# Patient Record
Sex: FEMALE | Race: OTHER | HISPANIC OR LATINO
[De-identification: names, ages, dates, MRNs, and addresses within clinical notes are randomized per-mention and may not be internally consistent; named-entity substitution may affect disease eponyms.]

---

## 2017-03-20 ENCOUNTER — APPOINTMENT (OUTPATIENT)
Dept: FAMILY MEDICINE | Facility: HOSPITAL | Age: 47
End: 2017-03-20

## 2017-03-20 ENCOUNTER — OUTPATIENT (OUTPATIENT)
Dept: OUTPATIENT SERVICES | Facility: HOSPITAL | Age: 47
LOS: 1 days | End: 2017-03-20
Payer: SELF-PAY

## 2017-03-20 VITALS
HEART RATE: 95 BPM | BODY MASS INDEX: 26.66 KG/M2 | DIASTOLIC BLOOD PRESSURE: 70 MMHG | SYSTOLIC BLOOD PRESSURE: 128 MMHG | HEIGHT: 59.5 IN | OXYGEN SATURATION: 99 % | TEMPERATURE: 98 F | WEIGHT: 134 LBS | RESPIRATION RATE: 14 BRPM

## 2017-03-20 PROCEDURE — 87070 CULTURE OTHR SPECIMN AEROBIC: CPT

## 2017-03-20 PROCEDURE — G0463: CPT

## 2018-07-10 ENCOUNTER — APPOINTMENT (OUTPATIENT)
Dept: FAMILY MEDICINE | Facility: HOSPITAL | Age: 48
End: 2018-07-10

## 2018-07-10 ENCOUNTER — RESULT CHARGE (OUTPATIENT)
Age: 48
End: 2018-07-10

## 2018-07-10 ENCOUNTER — OUTPATIENT (OUTPATIENT)
Dept: OUTPATIENT SERVICES | Facility: HOSPITAL | Age: 48
LOS: 1 days | End: 2018-07-10
Payer: SELF-PAY

## 2018-07-10 VITALS
TEMPERATURE: 98 F | OXYGEN SATURATION: 100 % | HEART RATE: 76 BPM | RESPIRATION RATE: 14 BRPM | SYSTOLIC BLOOD PRESSURE: 135 MMHG | WEIGHT: 127 LBS | BODY MASS INDEX: 25.22 KG/M2 | DIASTOLIC BLOOD PRESSURE: 86 MMHG

## 2018-07-10 DIAGNOSIS — Z87.09 PERSONAL HISTORY OF OTHER DISEASES OF THE RESPIRATORY SYSTEM: ICD-10-CM

## 2018-07-10 DIAGNOSIS — E66.3 OVERWEIGHT: ICD-10-CM

## 2018-07-10 DIAGNOSIS — B00.9 HERPESVIRAL INFECTION, UNSPECIFIED: ICD-10-CM

## 2018-07-10 DIAGNOSIS — Z87.42 PERSONAL HISTORY OF OTHER DISEASES OF THE FEMALE GENITAL TRACT: ICD-10-CM

## 2018-07-10 DIAGNOSIS — Z87.2 PERSONAL HISTORY OF DISEASES OF THE SKIN AND SUBCUTANEOUS TISSUE: ICD-10-CM

## 2018-07-10 DIAGNOSIS — Z87.898 PERSONAL HISTORY OF OTHER SPECIFIED CONDITIONS: ICD-10-CM

## 2018-07-10 DIAGNOSIS — J06.9 ACUTE UPPER RESPIRATORY INFECTION, UNSPECIFIED: ICD-10-CM

## 2018-07-10 DIAGNOSIS — N94.9 UNSPECIFIED CONDITION ASSOCIATED WITH FEMALE GENITAL ORGANS AND MENSTRUAL CYCLE: ICD-10-CM

## 2018-07-10 DIAGNOSIS — Z86.69 PERSONAL HISTORY OF OTHER DISEASES OF THE NERVOUS SYSTEM AND SENSE ORGANS: ICD-10-CM

## 2018-07-10 DIAGNOSIS — G47.8 OTHER SLEEP DISORDERS: ICD-10-CM

## 2018-07-10 DIAGNOSIS — Z00.00 ENCOUNTER FOR GENERAL ADULT MEDICAL EXAMINATION WITHOUT ABNORMAL FINDINGS: ICD-10-CM

## 2018-07-10 PROCEDURE — 83036 HEMOGLOBIN GLYCOSYLATED A1C: CPT

## 2018-07-10 PROCEDURE — 80061 LIPID PANEL: CPT

## 2018-07-10 PROCEDURE — 80053 COMPREHEN METABOLIC PANEL: CPT

## 2018-07-10 PROCEDURE — G0463: CPT

## 2018-07-10 RX ORDER — PHENOL 1.4 %
1.4 AEROSOL, SPRAY (ML) MUCOUS MEMBRANE
Qty: 1 | Refills: 0 | Status: DISCONTINUED | COMMUNITY
Start: 2017-03-20 | End: 2018-07-10

## 2018-07-10 RX ORDER — OMEPRAZOLE 20 MG/1
20 CAPSULE, DELAYED RELEASE ORAL DAILY
Qty: 30 | Refills: 2 | Status: DISCONTINUED | COMMUNITY
Start: 2017-03-20 | End: 2018-07-10

## 2018-07-10 RX ORDER — CETIRIZINE HYDROCHLORIDE 10 MG/1
10 CAPSULE, LIQUID FILLED ORAL
Qty: 30 | Refills: 0 | Status: DISCONTINUED | COMMUNITY
Start: 2017-03-20 | End: 2018-07-10

## 2018-07-10 NOTE — HEALTH RISK ASSESSMENT
[Excellent] : ~his/her~  mood as  excellent [] : No [No falls in past year] : Patient reported no falls in the past year [0] : 2) Feeling down, depressed, or hopeless: Not at all (0) [[id number]] : 0 [Patient reported PAP Smear was normal] : Patient reported PAP Smear was normal [Change in mental status noted] : No change in mental status noted [Language] : denies difficulty with language [Behavior] : denies difficulty with behavior [Learning/Retaining New Information] : denies difficulty learning/retaining new information [Handling Complex Tasks] : denies difficulty handling complex tasks [Reasoning] : denies difficulty with reasoning [Spatial Ability and Orientation] : denies difficulty with spatial ability and orientation [None] : None [With Significant Other] : lives with significant other [Employed] : employed [Significant Other] : lives with significant other [Sexually Active] : sexually active [High Risk Behavior] : no high risk behavior [Feels Safe at Home] : Feels safe at home [Fully functional (bathing, dressing, toileting, transferring, walking, feeding)] : Fully functional (bathing, dressing, toileting, transferring, walking, feeding) [Fully functional (using the telephone, shopping, preparing meals, housekeeping, doing laundry, using] : Fully functional and needs no help or supervision to perform IADLs (using the telephone, shopping, preparing meals, housekeeping, doing laundry, using transportation, managing medications and managing finances) [Reports changes in hearing] : Reports no changes in hearing [Reports changes in vision] : Reports no changes in vision [Reports changes in dental health] : Reports no changes in dental health [Smoke Detector] : smoke detector [Carbon Monoxide Detector] : carbon monoxide detector [Guns at Home] : no guns at home [Seat Belt] :  uses seat belt [MammogramComments] : Ordered for Patient [PapSmearDate] : 08/14

## 2018-07-10 NOTE — PAST MEDICAL HISTORY
[Postmenopausal] : postmenopausal [Menopause Age____] : age at menopause was [unfilled] [Approximately ___] : the LMP was approximately [unfilled]

## 2018-07-10 NOTE — HISTORY OF PRESENT ILLNESS
[FreeTextEntry1] : CPE [de-identified] : 47 year old female with PMH of GERD and Prediabetes is here for a CPE. Pt sates she feels well. Reports she has not have her period in 3 years. Pt recently became sexually active and wanted to know if she was pregnant. \par Denies any complaints. No Fever, Chills, Nausea, Vomiting, Diarrhea, Headache, Chest Pain, Shortness of breath or Abdominal pain.\par

## 2018-07-10 NOTE — PLAN
[FreeTextEntry1] : # CPE / Prediabetes\par - Exam WNL\par - Routine labs ordered f/u results\par - Counselled pt regarding weight loss, diet and lifestyle modifications\par \par # Menopause\par - Urine pregnancy test NEG\par \par # HM\par - Mammogram ordered\par - PAP + HPV due in 2019\par \par \par D/W Dr. Horan\par \par

## 2018-07-10 NOTE — PHYSICAL EXAM

## 2018-07-11 DIAGNOSIS — Z12.31 ENCOUNTER FOR SCREENING MAMMOGRAM FOR MALIGNANT NEOPLASM OF BREAST: ICD-10-CM

## 2018-07-11 DIAGNOSIS — R73.03 PREDIABETES: ICD-10-CM

## 2018-07-11 DIAGNOSIS — K21.9 GASTRO-ESOPHAGEAL REFLUX DISEASE WITHOUT ESOPHAGITIS: ICD-10-CM

## 2018-07-11 LAB
BASOPHILS # BLD AUTO: 0.03 K/UL
BASOPHILS NFR BLD AUTO: 0.6 %
EOSINOPHIL # BLD AUTO: 0.13 K/UL
EOSINOPHIL NFR BLD AUTO: 2.7 %
HBA1C MFR BLD HPLC: 5.7 %
HCG UR QL: NEGATIVE
HCT VFR BLD CALC: 40.5 %
HGB BLD-MCNC: 13.3 G/DL
IMM GRANULOCYTES NFR BLD AUTO: 0.2 %
LYMPHOCYTES # BLD AUTO: 2.3 K/UL
LYMPHOCYTES NFR BLD AUTO: 47.4 %
MAN DIFF?: NORMAL
MCHC RBC-ENTMCNC: 30.4 PG
MCHC RBC-ENTMCNC: 32.8 GM/DL
MCV RBC AUTO: 92.7 FL
MONOCYTES # BLD AUTO: 0.23 K/UL
MONOCYTES NFR BLD AUTO: 4.7 %
NEUTROPHILS # BLD AUTO: 2.15 K/UL
NEUTROPHILS NFR BLD AUTO: 44.4 %
PLATELET # BLD AUTO: 278 K/UL
QUALITY CONTROL: YES
RBC # BLD: 4.37 M/UL
RBC # FLD: 15.6 %
WBC # FLD AUTO: 4.85 K/UL

## 2018-07-13 LAB
ALBUMIN SERPL ELPH-MCNC: 4.8 G/DL
ALP BLD-CCNC: 106 U/L
ALT SERPL-CCNC: 14 U/L
ANION GAP SERPL CALC-SCNC: 17 MMOL/L
AST SERPL-CCNC: 21 U/L
BILIRUB SERPL-MCNC: 0.2 MG/DL
BUN SERPL-MCNC: 13 MG/DL
CALCIUM SERPL-MCNC: 9.5 MG/DL
CHLORIDE SERPL-SCNC: 98 MMOL/L
CHOLEST SERPL-MCNC: 222 MG/DL
CHOLEST/HDLC SERPL: 2.9 RATIO
CO2 SERPL-SCNC: 26 MMOL/L
CREAT SERPL-MCNC: 0.61 MG/DL
GLUCOSE SERPL-MCNC: 50 MG/DL
HDLC SERPL-MCNC: 76 MG/DL
LDLC SERPL CALC-MCNC: 109 MG/DL
POTASSIUM SERPL-SCNC: 3.6 MMOL/L
PROT SERPL-MCNC: 8.1 G/DL
SODIUM SERPL-SCNC: 141 MMOL/L
TRIGL SERPL-MCNC: 186 MG/DL

## 2018-08-06 ENCOUNTER — RX RENEWAL (OUTPATIENT)
Age: 48
End: 2018-08-06

## 2018-08-08 ENCOUNTER — APPOINTMENT (OUTPATIENT)
Dept: FAMILY MEDICINE | Facility: HOSPITAL | Age: 48
End: 2018-08-08

## 2018-08-08 ENCOUNTER — OUTPATIENT (OUTPATIENT)
Dept: OUTPATIENT SERVICES | Facility: HOSPITAL | Age: 48
LOS: 1 days | End: 2018-08-08
Payer: SELF-PAY

## 2018-08-08 VITALS
HEART RATE: 76 BPM | TEMPERATURE: 98.7 F | DIASTOLIC BLOOD PRESSURE: 80 MMHG | WEIGHT: 131 LBS | RESPIRATION RATE: 14 BRPM | OXYGEN SATURATION: 99 % | SYSTOLIC BLOOD PRESSURE: 122 MMHG | HEIGHT: 59.5 IN | BODY MASS INDEX: 26.06 KG/M2

## 2018-08-08 DIAGNOSIS — Z00.00 ENCOUNTER FOR GENERAL ADULT MEDICAL EXAMINATION WITHOUT ABNORMAL FINDINGS: ICD-10-CM

## 2018-08-08 PROCEDURE — G0463: CPT

## 2018-08-08 NOTE — PHYSICAL EXAM
[No Acute Distress] : no acute distress [Well Nourished] : well nourished [Well Developed] : well developed [Well-Appearing] : well-appearing [Normal Sclera/Conjunctiva] : normal sclera/conjunctiva [EOMI] : extraocular movements intact [No Respiratory Distress] : no respiratory distress  [Clear to Auscultation] : lungs were clear to auscultation bilaterally [No Accessory Muscle Use] : no accessory muscle use [Normal Rate] : normal rate  [Regular Rhythm] : with a regular rhythm [Normal S1, S2] : normal S1 and S2 [No Murmur] : no murmur heard [Pedal Pulses Present] : the pedal pulses are present [No Edema] : there was no peripheral edema [No Extremity Clubbing/Cyanosis] : no extremity clubbing/cyanosis [No Palpable Aorta] : no palpable aorta [Soft] : abdomen soft [Non Tender] : non-tender [Non-distended] : non-distended [Normal Bowel Sounds] : normal bowel sounds [Normal Posterior Cervical Nodes] : no posterior cervical lymphadenopathy [Grossly Normal Strength/Tone] : grossly normal strength/tone [No Rash] : no rash [Normal Gait] : normal gait [Coordination Grossly Intact] : coordination grossly intact [No Focal Deficits] : no focal deficits [Deep Tendon Reflexes (DTR)] : deep tendon reflexes were 2+ and symmetric [Normal Affect] : the affect was normal [Normal Insight/Judgement] : insight and judgment were intact [de-identified] : prominent veins on dorsum of feet bilaterally, prominent vein on posterior lateral aspect of left calf. shaved legs [de-identified] : no skin discoloration on lower extremity

## 2018-08-08 NOTE — HISTORY OF PRESENT ILLNESS
[FreeTextEntry8] : 46yo F with prediabetes presents to clinic seeking referral for treatment of bilateral lower extremity veins. Patient notes that the varicose veins started after her son was born in 1994, and have progressively gotten worse. The veins are most prominent after walking and when it is warm outside. The veins do not cause her any pain. The patient is requesting the referral "because it does not look good" and " I want to wear shorts". \par \par Patient denies any pain with exercise, swelling, skin color changes of the lower extremities.

## 2018-08-08 NOTE — PLAN
[FreeTextEntry1] : 48yo F w/ h/o prediabetes presents to clinic requesting referral for surgical evaluation of varicose veins. \par \par # Asymptomatic Varicose Veins\par - counseled on the risks of treatment (surgical or injections) eg. infection, reaction from anesthetics, reoccurrence of varicose veins etc. \par - compression stocking prescription given\par \par #Prediabetes: \par - last A1C 5.7 (7/2018)\par - counseled importance of healthy diet and exercise.\par \par Pt seen and examined with Dr. Ocampo

## 2018-08-09 DIAGNOSIS — I83.93 ASYMPTOMATIC VARICOSE VEINS OF BILATERAL LOWER EXTREMITIES: ICD-10-CM

## 2019-04-26 ENCOUNTER — APPOINTMENT (OUTPATIENT)
Age: 49
End: 2019-04-26

## 2019-04-26 ENCOUNTER — OUTPATIENT (OUTPATIENT)
Dept: OUTPATIENT SERVICES | Facility: HOSPITAL | Age: 49
LOS: 1 days | End: 2019-04-26
Payer: SELF-PAY

## 2019-04-26 VITALS
BODY MASS INDEX: 25.02 KG/M2 | DIASTOLIC BLOOD PRESSURE: 75 MMHG | TEMPERATURE: 98.1 F | OXYGEN SATURATION: 100 % | HEART RATE: 75 BPM | WEIGHT: 126 LBS | RESPIRATION RATE: 14 BRPM | SYSTOLIC BLOOD PRESSURE: 119 MMHG

## 2019-04-26 DIAGNOSIS — Z00.00 ENCOUNTER FOR GENERAL ADULT MEDICAL EXAMINATION WITHOUT ABNORMAL FINDINGS: ICD-10-CM

## 2019-04-26 PROCEDURE — G0463: CPT

## 2019-04-26 NOTE — REVIEW OF SYSTEMS
[Fever] : no fever [Chills] : no chills [Chest Pain] : no chest pain [Palpitations] : no palpitations [Shortness Of Breath] : no shortness of breath [Wheezing] : no wheezing [Nausea] : no nausea [Abdominal Pain] : no abdominal pain [Constipation] : no constipation [Vomiting] : no vomiting [Diarrhea] : diarrhea

## 2019-04-26 NOTE — PLAN
[FreeTextEntry1] : 48F w/PMH of GERD, prediabetes presents w/ a 7 day hx of hives likely 2/2 new detergent\par \par #Urticaria\par - Start OTC Cetirizine\par - Advised pt to avoid new detergent and new foods\par - RTC in 1 week if not resolved or head to ED if difficulty breathing\par \par Case discussed w/ Dr Shields

## 2019-04-26 NOTE — HISTORY OF PRESENT ILLNESS
[FreeTextEntry8] : 48F w/PMH of GERD, prediabetes presents w/ a 7 day hx of hives. Urticaria present on medial aspect of RUE and B/L medial thighs. Urticaria is pruritic, waxing/waning. Pt has not tried OTC medications. Pt reports she recently changed clothing detergent from ALL to Tide, also reports recently at frozen food which she had not tried before. No SoB, no angioedema. No new medications or new pets.

## 2019-04-26 NOTE — PHYSICAL EXAM
[Well Nourished] : well nourished [Well Developed] : well developed [No Acute Distress] : no acute distress [Well-Appearing] : well-appearing [Normal Sclera/Conjunctiva] : normal sclera/conjunctiva [Normal Rate] : normal rate  [EOMI] : extraocular movements intact [PERRL] : pupils equal round and reactive to light [Normal S1, S2] : normal S1 and S2 [Regular Rhythm] : with a regular rhythm [No Murmur] : no murmur heard [de-identified] : Urticaria of medial aspect of RUE, light abrasions from scratching

## 2019-04-29 DIAGNOSIS — L50.9 URTICARIA, UNSPECIFIED: ICD-10-CM

## 2020-01-08 ENCOUNTER — OUTPATIENT (OUTPATIENT)
Dept: OUTPATIENT SERVICES | Facility: HOSPITAL | Age: 50
LOS: 1 days | End: 2020-01-08
Payer: SELF-PAY

## 2020-01-08 ENCOUNTER — APPOINTMENT (OUTPATIENT)
Dept: FAMILY MEDICINE | Facility: HOSPITAL | Age: 50
End: 2020-01-08

## 2020-01-08 ENCOUNTER — MED ADMIN CHARGE (OUTPATIENT)
Age: 50
End: 2020-01-08

## 2020-01-08 VITALS
SYSTOLIC BLOOD PRESSURE: 129 MMHG | DIASTOLIC BLOOD PRESSURE: 84 MMHG | TEMPERATURE: 98.8 F | OXYGEN SATURATION: 100 % | RESPIRATION RATE: 14 BRPM | WEIGHT: 132 LBS | BODY MASS INDEX: 26.21 KG/M2 | HEART RATE: 82 BPM

## 2020-01-08 DIAGNOSIS — L50.8 OTHER URTICARIA: ICD-10-CM

## 2020-01-08 DIAGNOSIS — Z00.00 ENCOUNTER FOR GENERAL ADULT MEDICAL EXAMINATION WITHOUT ABNORMAL FINDINGS: ICD-10-CM

## 2020-01-08 PROCEDURE — 80048 BASIC METABOLIC PNL TOTAL CA: CPT

## 2020-01-08 PROCEDURE — 83036 HEMOGLOBIN GLYCOSYLATED A1C: CPT

## 2020-01-08 PROCEDURE — 90471 IMMUNIZATION ADMIN: CPT

## 2020-01-08 PROCEDURE — G0008: CPT

## 2020-01-08 PROCEDURE — 80061 LIPID PANEL: CPT

## 2020-01-08 PROCEDURE — G0463: CPT

## 2020-01-08 RX ORDER — RANITIDINE HCL 75 MG
75 TABLET ORAL
Qty: 60 | Refills: 0 | Status: DISCONTINUED | COMMUNITY
Start: 2018-08-06 | End: 2020-01-08

## 2020-01-08 RX ORDER — RANITIDINE 75 MG/1
75 TABLET ORAL
Qty: 60 | Refills: 0 | Status: DISCONTINUED | COMMUNITY
Start: 2018-07-10 | End: 2020-01-08

## 2020-01-08 NOTE — REVIEW OF SYSTEMS
[Earache] : earache [Nasal Discharge] : nasal discharge [Fever] : no fever [Chills] : no chills [Hoarseness] : no hoarseness [Hearing Loss] : no hearing loss [Postnasal Drip] : no postnasal drip [Chest Pain] : no chest pain [Palpitations] : no palpitations [Wheezing] : no wheezing [Shortness Of Breath] : no shortness of breath [Constipation] : no constipation [Nausea] : no nausea [Abdominal Pain] : no abdominal pain [Vomiting] : no vomiting [Diarrhea] : diarrhea

## 2020-01-08 NOTE — HISTORY OF PRESENT ILLNESS
[de-identified] : 48F w/PMH of GERD, prediabetes presents for CPE. Pt has complaint today of 4 day hx of R ear pain. Slight stabbing, pain radiates down R neck. Has not tried any OTC medications. Endorses some nasal congestion. Denies fevers, ear discharge, sore throat, sick contacts. Also endorses some mild fatigue. Pt otherwise has no complaints at this time and denies fever/chills, NVD, weight loss/gain, CP, SoB. Works as a . LMP a few years ago.  [FreeTextEntry1] : CPE

## 2020-01-08 NOTE — PLAN
[FreeTextEntry1] : 48F w/PMH of GERD, prediabetes presents for CPE. Complaining of R ear fullness.\par \par #Ear fullness \par - No sign of otitis externa, no effusion\par - Ibuprofen PRN for pain\par - Patient instructed to return for new, persistent or increasing symptoms and verbalized understanding\par \par #Glaucoma\par - Pt presented slip requesting referral to optho for glaucoma screening\par \par #HM\par - F/U CBC, BMP, lipids\par - Weight: goal BMI <25. \par - Physical Activity: Advised pt 150 min/wk aerobic activity recommended\par - Medical Nutritional Therapy: Mediterranean diet recommended. \par - Smoking: non-smoker \par - RTC in 2wks for pap\par \par #Prediabetes\par - Previous A1c 5.7 on  July 2018\par - F/U A1c \par \par #Need for Tdap\par - Tdap today \par \par #Need for flu vaccine \par - Flu vaccine today \par \par Case discussed w/ Dr Rascon

## 2020-01-08 NOTE — PHYSICAL EXAM
[No Acute Distress] : no acute distress [Well Nourished] : well nourished [Well-Appearing] : well-appearing [Well Developed] : well developed [EOMI] : extraocular movements intact [Normal Sclera/Conjunctiva] : normal sclera/conjunctiva [Normal Outer Ear/Nose] : the outer ears and nose were normal in appearance [No Lymphadenopathy] : no lymphadenopathy [Supple] : supple [Thyroid Normal, No Nodules] : the thyroid was normal and there were no nodules present [No Respiratory Distress] : no respiratory distress  [No Accessory Muscle Use] : no accessory muscle use [Normal Rate] : normal rate  [Regular Rhythm] : with a regular rhythm [Clear to Auscultation] : lungs were clear to auscultation bilaterally [Normal S1, S2] : normal S1 and S2 [No Murmur] : no murmur heard [Pedal Pulses Present] : the pedal pulses are present [No Edema] : there was no peripheral edema [Non Tender] : non-tender [Soft] : abdomen soft [No Extremity Clubbing/Cyanosis] : no extremity clubbing/cyanosis [Non-distended] : non-distended [No Masses] : no abdominal mass palpated [No HSM] : no HSM [Normal Posterior Cervical Nodes] : no posterior cervical lymphadenopathy [Normal Bowel Sounds] : normal bowel sounds [Normal Anterior Cervical Nodes] : no anterior cervical lymphadenopathy [No Joint Swelling] : no joint swelling [No Rash] : no rash [Coordination Grossly Intact] : coordination grossly intact [Grossly Normal Strength/Tone] : grossly normal strength/tone [No Focal Deficits] : no focal deficits [Normal Gait] : normal gait [Normal Insight/Judgement] : insight and judgment were intact [Normal Affect] : the affect was normal [Alert and Oriented x3] : oriented to person, place, and time [de-identified] : No tenderness on manipulation of the R ear. No erythema, tympanic membrane bulging. No postnasal drip. No sinus discharge.

## 2020-01-13 LAB
ANION GAP SERPL CALC-SCNC: 14 MMOL/L
BASOPHILS # BLD AUTO: 0.04 K/UL
BASOPHILS NFR BLD AUTO: 0.9 %
BUN SERPL-MCNC: 10 MG/DL
CALCIUM SERPL-MCNC: 9.5 MG/DL
CHLORIDE SERPL-SCNC: 105 MMOL/L
CHOLEST SERPL-MCNC: 220 MG/DL
CHOLEST/HDLC SERPL: 3.4 RATIO
CO2 SERPL-SCNC: 24 MMOL/L
CREAT SERPL-MCNC: 0.62 MG/DL
EOSINOPHIL # BLD AUTO: 0.16 K/UL
EOSINOPHIL NFR BLD AUTO: 3.4 %
ESTIMATED AVERAGE GLUCOSE: 126 MG/DL
GLUCOSE SERPL-MCNC: 102 MG/DL
HBA1C MFR BLD HPLC: 6 %
HCT VFR BLD CALC: 40.8 %
HDLC SERPL-MCNC: 64 MG/DL
HGB BLD-MCNC: 12.9 G/DL
IMM GRANULOCYTES NFR BLD AUTO: 0.2 %
LDLC SERPL CALC-MCNC: 122 MG/DL
LYMPHOCYTES # BLD AUTO: 1.47 K/UL
LYMPHOCYTES NFR BLD AUTO: 31.3 %
MAN DIFF?: NORMAL
MCHC RBC-ENTMCNC: 29.4 PG
MCHC RBC-ENTMCNC: 31.6 GM/DL
MCV RBC AUTO: 92.9 FL
MONOCYTES # BLD AUTO: 0.36 K/UL
MONOCYTES NFR BLD AUTO: 7.7 %
NEUTROPHILS # BLD AUTO: 2.66 K/UL
NEUTROPHILS NFR BLD AUTO: 56.5 %
PLATELET # BLD AUTO: 268 K/UL
POTASSIUM SERPL-SCNC: 5 MMOL/L
RBC # BLD: 4.39 M/UL
RBC # FLD: 13.6 %
SODIUM SERPL-SCNC: 142 MMOL/L
TRIGL SERPL-MCNC: 172 MG/DL
WBC # FLD AUTO: 4.7 K/UL

## 2020-01-16 DIAGNOSIS — Z23 ENCOUNTER FOR IMMUNIZATION: ICD-10-CM

## 2020-01-16 DIAGNOSIS — R73.03 PREDIABETES: ICD-10-CM

## 2020-01-16 DIAGNOSIS — H93.8X1 OTHER SPECIFIED DISORDERS OF RIGHT EAR: ICD-10-CM

## 2020-01-16 DIAGNOSIS — H40.9 UNSPECIFIED GLAUCOMA: ICD-10-CM

## 2020-01-25 ENCOUNTER — APPOINTMENT (OUTPATIENT)
Dept: FAMILY MEDICINE | Facility: HOSPITAL | Age: 50
End: 2020-01-25

## 2020-09-12 ENCOUNTER — OUTPATIENT (OUTPATIENT)
Dept: OUTPATIENT SERVICES | Facility: HOSPITAL | Age: 50
LOS: 1 days | End: 2020-09-12
Payer: SELF-PAY

## 2020-09-12 ENCOUNTER — RESULT CHARGE (OUTPATIENT)
Age: 50
End: 2020-09-12

## 2020-09-12 ENCOUNTER — APPOINTMENT (OUTPATIENT)
Dept: FAMILY MEDICINE | Facility: HOSPITAL | Age: 50
End: 2020-09-12

## 2020-09-12 VITALS
BODY MASS INDEX: 25.62 KG/M2 | SYSTOLIC BLOOD PRESSURE: 145 MMHG | RESPIRATION RATE: 14 BRPM | TEMPERATURE: 98 F | OXYGEN SATURATION: 98 % | WEIGHT: 129 LBS | DIASTOLIC BLOOD PRESSURE: 90 MMHG | HEART RATE: 71 BPM

## 2020-09-12 DIAGNOSIS — M54.9 DORSALGIA, UNSPECIFIED: ICD-10-CM

## 2020-09-12 DIAGNOSIS — Z00.00 ENCOUNTER FOR GENERAL ADULT MEDICAL EXAMINATION WITHOUT ABNORMAL FINDINGS: ICD-10-CM

## 2020-09-12 PROCEDURE — G0463: CPT

## 2020-09-12 NOTE — REVIEW OF SYSTEMS
[Dryness] : dryness  [Vision Problems] : vision problems [Muscle Pain] : muscle pain [Back Pain] : back pain [Fever] : no fever [Chills] : no chills [Fatigue] : no fatigue [Hot Flashes] : no hot flashes [Night Sweats] : no night sweats [Recent Change In Weight] : ~T no recent weight change [Discharge] : no discharge [Pain] : no pain [Redness] : no redness [Itching] : no itching [Chest Pain] : no chest pain [Palpitations] : no palpitations [Shortness Of Breath] : no shortness of breath [Wheezing] : no wheezing [Dysuria] : no dysuria [Incontinence] : no incontinence [Nocturia] : no nocturia [Hematuria] : no hematuria [Frequency] : no frequency [Vaginal Discharge] : no vaginal discharge [Dysmenorrhea] : no dysmenorrhea [Joint Pain] : no joint pain [Joint Stiffness] : no joint stiffness [Joint Swelling] : no joint swelling [Muscle Weakness] : no muscle weakness

## 2020-09-12 NOTE — ASSESSMENT
[FreeTextEntry1] : \par #Left back pain secondary to back strain\par -Encouraged tylenol and Naproxen 250 mg 2 pills BID PRN with food and take an extra pill if it bothers her more\par -Patient given back exercises to do at home. \par \par #Eye dryness\par -Allergic conjuntivitis vs Glaucoma(low suspicion) \par -Opthalmology referral given\par \par #Prediabetes\par -A1c 6. Wieght stable\par -Encouraged wieght loss\par - Discussed the benefits of weight loss with pt, and risks associated with obesity. Pt is receptive to lifestyle modification techniques to lose weight - at least 30mins-1hr aerobic exercises/day x5 days per week advised\par - Decreased food portion sizes, increase in whole grains, assorted vegetables and fruits and decreased sugar beverages discussed and encouraged\par - Weight loss goal of 4-5lbs within the next 4-5 weeks encouraged. Pt is receptive to this goal.\par \par #Health\par -Flu vaccine given today\par -Patient deferred pap/HPV for next visit\par \par RTC in one week for PAP/HPV\par

## 2020-09-12 NOTE — PHYSICAL EXAM
[No Acute Distress] : no acute distress [Well Nourished] : well nourished [Well Developed] : well developed [PERRL] : pupils equal round and reactive to light [Normal Sclera/Conjunctiva] : normal sclera/conjunctiva [EOMI] : extraocular movements intact [Regular Rhythm] : with a regular rhythm [No Respiratory Distress] : no respiratory distress  [Normal Rate] : normal rate  [de-identified] : Back:\par Inspection: No Erythema present, Iliac crest height equal,\par Palpation: NTTP along Spinous processes L1-L5 , Paraspinal muscles, iliac crests non tender, SIJ non tender, gluteal region non tender\par ROM: Forward flexion 80 degrees with pain in left back, Extension 30 degrees without pain, Adduction 30 degrees with pain in left lower back                                  Neuurological: DTR's - Patellar 2+/4, Achilles 2+/4 and equal bilateral \par Sensation- intact to light touch L2-L5, S1\par Strength testing: Knee extension: 5/5, dorsiflexion 5/5, plantarflexion 5/5, great toe extension: 5/5.\par \par Special tests:\par \par Straight leg test negative\par

## 2020-09-12 NOTE — HISTORY OF PRESENT ILLNESS
[FreeTextEntry8] : Patient is a 49 year old F with a significant history of prediabetes a1c 6, allergic conjuntivitis who presents with  subacute left back pain. Patient reports the pain started 2 months ago insidiously without inciting trauma and has been intermittent. Patient states the pain is located along left lower back/flank, nonradiating,5/10, dull pain that is triggered by positional movement. Patient states she hasn't iced, heated, taken medications for the pain. Pain and lasts for a couple seconds.  Patient denies urinary/rectal incontinence, fevers/chills, motor or sensory changes, bowel/bladder changes, or any prior trauma/surgical history.  \par \par Of note, Patient reports she has chronic dry eyes that bother her daily. Patient reports using eye drops BID with relief. Patient has not seen ophthalmologist in 6 years. Patient reports that the pain is sometimes associated with blurry vision and pain bilaterally.  \par \par \par

## 2020-09-15 DIAGNOSIS — S39.012A STRAIN OF MUSCLE, FASCIA AND TENDON OF LOWER BACK, INITIAL ENCOUNTER: ICD-10-CM

## 2020-09-19 ENCOUNTER — APPOINTMENT (OUTPATIENT)
Dept: FAMILY MEDICINE | Facility: HOSPITAL | Age: 50
End: 2020-09-19

## 2020-10-01 LAB
BILIRUB UR QL STRIP: NORMAL
GLUCOSE UR-MCNC: NORMAL
HCG UR QL: 0.2 EU/DL
HGB UR QL STRIP.AUTO: NORMAL
KETONES UR-MCNC: NORMAL
LEUKOCYTE ESTERASE UR QL STRIP: NORMAL
NITRITE UR QL STRIP: NORMAL
PH UR STRIP: 7
PROT UR STRIP-MCNC: NORMAL
SP GR UR STRIP: 1025

## 2020-12-15 ENCOUNTER — APPOINTMENT (OUTPATIENT)
Dept: OPHTHALMOLOGY | Facility: CLINIC | Age: 50
End: 2020-12-15

## 2021-01-09 ENCOUNTER — OUTPATIENT (OUTPATIENT)
Dept: OUTPATIENT SERVICES | Facility: HOSPITAL | Age: 51
LOS: 1 days | End: 2021-01-09
Payer: SELF-PAY

## 2021-01-09 ENCOUNTER — APPOINTMENT (OUTPATIENT)
Dept: FAMILY MEDICINE | Facility: HOSPITAL | Age: 51
End: 2021-01-09

## 2021-01-09 VITALS
WEIGHT: 128 LBS | BODY MASS INDEX: 25.8 KG/M2 | HEART RATE: 73 BPM | OXYGEN SATURATION: 99 % | TEMPERATURE: 97.6 F | DIASTOLIC BLOOD PRESSURE: 83 MMHG | HEIGHT: 59 IN | SYSTOLIC BLOOD PRESSURE: 120 MMHG | RESPIRATION RATE: 16 BRPM

## 2021-01-09 DIAGNOSIS — K21.9 GASTRO-ESOPHAGEAL REFLUX DISEASE W/OUT ESOPHAGITIS: ICD-10-CM

## 2021-01-09 DIAGNOSIS — H93.8X1 OTHER SPECIFIED DISORDERS OF RIGHT EAR: ICD-10-CM

## 2021-01-09 DIAGNOSIS — I83.93 ASYMPTOMATIC VARICOSE VEINS OF BILATERAL LOWER EXTREMITIES: ICD-10-CM

## 2021-01-09 DIAGNOSIS — Z00.00 ENCOUNTER FOR GENERAL ADULT MEDICAL EXAMINATION W/OUT ABNORMAL FINDINGS: ICD-10-CM

## 2021-01-09 DIAGNOSIS — Z23 ENCOUNTER FOR IMMUNIZATION: ICD-10-CM

## 2021-01-09 DIAGNOSIS — H40.9 UNSPECIFIED GLAUCOMA: ICD-10-CM

## 2021-01-09 DIAGNOSIS — Z92.29 PERSONAL HISTORY OF OTHER DRUG THERAPY: ICD-10-CM

## 2021-01-09 DIAGNOSIS — S39.012A STRAIN OF MUSCLE, FASCIA AND TENDON OF LOWER BACK, INITIAL ENCOUNTER: ICD-10-CM

## 2021-01-09 DIAGNOSIS — Z00.00 ENCOUNTER FOR GENERAL ADULT MEDICAL EXAMINATION WITHOUT ABNORMAL FINDINGS: ICD-10-CM

## 2021-01-09 PROCEDURE — G0463: CPT

## 2021-01-09 PROCEDURE — 82274 ASSAY TEST FOR BLOOD FECAL: CPT

## 2021-01-09 RX ORDER — NAPROXEN 250 MG/1
250 TABLET ORAL TWICE DAILY
Qty: 10 | Refills: 0 | Status: DISCONTINUED | COMMUNITY
Start: 2020-09-12 | End: 2021-01-09

## 2021-01-09 RX ORDER — CYCLOBENZAPRINE HYDROCHLORIDE 10 MG/1
10 TABLET, FILM COATED ORAL
Qty: 10 | Refills: 0 | Status: DISCONTINUED | COMMUNITY
Start: 2020-09-12 | End: 2021-01-09

## 2021-01-09 NOTE — HEALTH RISK ASSESSMENT
[Good] : ~his/her~ current health as good [Poor] : ~his/her~ mood as  poor [] : No [No] : In the past 12 months have you used drugs other than those required for medical reasons? No [2] : 2) Feeling down, depressed, or hopeless for more than half of the days (2) [[id number]] : 4 [[id number]] : 22 [Fully functional (bathing, dressing, toileting, transferring, walking, feeding)] : Fully functional (bathing, dressing, toileting, transferring, walking, feeding) [Fully functional (using the telephone, shopping, preparing meals, housekeeping, doing laundry, using] : Fully functional and needs no help or supervision to perform IADLs (using the telephone, shopping, preparing meals, housekeeping, doing laundry, using transportation, managing medications and managing finances) [With Patient/Caregiver] : With Patient/Caregiver [AdvancecareDate] : 01/20

## 2021-01-09 NOTE — PHYSICAL EXAM
[No Acute Distress] : no acute distress [Well Nourished] : well nourished [Well Developed] : well developed [Well-Appearing] : well-appearing [Normal Sclera/Conjunctiva] : normal sclera/conjunctiva [PERRL] : pupils equal round and reactive to light [EOMI] : extraocular movements intact [Normal Outer Ear/Nose] : the outer ears and nose were normal in appearance [Normal Oropharynx] : the oropharynx was normal [No JVD] : no jugular venous distention [No Lymphadenopathy] : no lymphadenopathy [Supple] : supple [Thyroid Normal, No Nodules] : the thyroid was normal and there were no nodules present [No Respiratory Distress] : no respiratory distress  [No Accessory Muscle Use] : no accessory muscle use [Clear to Auscultation] : lungs were clear to auscultation bilaterally [Normal Rate] : normal rate  [Regular Rhythm] : with a regular rhythm [Normal S1, S2] : normal S1 and S2 [No Murmur] : no murmur heard [No Carotid Bruits] : no carotid bruits [No Abdominal Bruit] : a ~M bruit was not heard ~T in the abdomen [No Varicosities] : no varicosities [Pedal Pulses Present] : the pedal pulses are present [No Edema] : there was no peripheral edema [No Palpable Aorta] : no palpable aorta [No Extremity Clubbing/Cyanosis] : no extremity clubbing/cyanosis [Soft] : abdomen soft [Non Tender] : non-tender [Non-distended] : non-distended [No Masses] : no abdominal mass palpated [No HSM] : no HSM [Normal Bowel Sounds] : normal bowel sounds [Normal Posterior Cervical Nodes] : no posterior cervical lymphadenopathy [Normal Anterior Cervical Nodes] : no anterior cervical lymphadenopathy [No CVA Tenderness] : no CVA  tenderness [No Spinal Tenderness] : no spinal tenderness [No Joint Swelling] : no joint swelling [Grossly Normal Strength/Tone] : grossly normal strength/tone [No Rash] : no rash [Coordination Grossly Intact] : coordination grossly intact [No Focal Deficits] : no focal deficits [Normal Gait] : normal gait [Speech Grossly Normal] : speech grossly normal [Normal Affect] : the affect was normal [Alert and Oriented x3] : oriented to person, place, and time [Normal Insight/Judgement] : insight and judgment were intact [de-identified] : tearful [de-identified] : FROM in all extremities and hip with the exception of mild pain with left sided rotation of the hip. no tenderness to palpation.

## 2021-01-09 NOTE — HISTORY OF PRESENT ILLNESS
[FreeTextEntry1] : CPE [de-identified] : 50F with PMH of preDM, GERD, Glaucoma presents for CPE. Reports left sided pain since August. Pt was seen for this pain in September and was given Flexiril which did not help. Describes pain as constant 7-10/10 worse with rest and resolved with work. Works as a house keeper. Denies any other associated symptoms. \par Pt also reports she has been depressed for months now. Reports suicidal ideation sometimes but has never thought about plan. she just 'feels she will be better not being alive'. \par PHq9; 22. no suicidal/homicidal ideation today. \par states she has family support at home.

## 2021-01-11 ENCOUNTER — NON-APPOINTMENT (OUTPATIENT)
Age: 51
End: 2021-01-11

## 2021-01-12 ENCOUNTER — NON-APPOINTMENT (OUTPATIENT)
Age: 51
End: 2021-01-12

## 2021-01-12 DIAGNOSIS — Z12.11 ENCOUNTER FOR SCREENING FOR MALIGNANT NEOPLASM OF COLON: ICD-10-CM

## 2021-01-12 DIAGNOSIS — Z12.31 ENCOUNTER FOR SCREENING MAMMOGRAM FOR MALIGNANT NEOPLASM OF BREAST: ICD-10-CM

## 2021-01-12 DIAGNOSIS — R10.9 UNSPECIFIED ABDOMINAL PAIN: ICD-10-CM

## 2021-01-12 DIAGNOSIS — F32.9 MAJOR DEPRESSIVE DISORDER, SINGLE EPISODE, UNSPECIFIED: ICD-10-CM

## 2021-01-12 DIAGNOSIS — R73.03 PREDIABETES: ICD-10-CM

## 2021-01-16 LAB — HEMOCCULT STL QL IA: NEGATIVE

## 2021-01-23 ENCOUNTER — APPOINTMENT (OUTPATIENT)
Dept: FAMILY MEDICINE | Facility: HOSPITAL | Age: 51
End: 2021-01-23

## 2021-04-13 ENCOUNTER — APPOINTMENT (OUTPATIENT)
Dept: OPHTHALMOLOGY | Facility: CLINIC | Age: 51
End: 2021-04-13

## 2021-09-27 ENCOUNTER — APPOINTMENT (OUTPATIENT)
Dept: FAMILY MEDICINE | Facility: HOSPITAL | Age: 51
End: 2021-09-27

## 2021-09-27 ENCOUNTER — OUTPATIENT (OUTPATIENT)
Dept: OUTPATIENT SERVICES | Facility: HOSPITAL | Age: 51
LOS: 1 days | End: 2021-09-27
Payer: SELF-PAY

## 2021-09-27 DIAGNOSIS — Z00.00 ENCOUNTER FOR GENERAL ADULT MEDICAL EXAMINATION WITHOUT ABNORMAL FINDINGS: ICD-10-CM

## 2021-09-27 PROCEDURE — U0003: CPT

## 2021-09-27 PROCEDURE — G0463: CPT

## 2021-09-27 PROCEDURE — U0005: CPT

## 2021-09-27 NOTE — HISTORY OF PRESENT ILLNESS
[Other Location: e.g. School (Enter Location, City,State)___] : at [unfilled], at the time of the visit. [Medical Office: ([facility])___] : at the medical office located in  [Verbal consent obtained from patient] : the patient, [unfilled] [FreeTextEntry1] : 50 y/o F presents to clinic for COVID-19 testing. Patient reports of non productive cough, congestion and subjective fever for few days. Patient denies headache, dizziness, change in vision, blurry vision, anosmia, ageusia, chest pain, palpitations, shortness of breath, abdominal pain, nausea, vomiting, diarrhea, constipation, hematochezia, melena, change in bowel movement, weight loss, dysuria, urinary frequency, urgency, hematuria, numbness, weakness or tingling. No recent travel. No known sick contacts.

## 2021-09-28 DIAGNOSIS — Z20.822 CONTACT WITH AND (SUSPECTED) EXPOSURE TO COVID-19: ICD-10-CM

## 2021-09-28 LAB — SARS-COV-2 N GENE NPH QL NAA+PROBE: NOT DETECTED

## 2021-10-04 ENCOUNTER — OUTPATIENT (OUTPATIENT)
Dept: OUTPATIENT SERVICES | Facility: HOSPITAL | Age: 51
LOS: 1 days | End: 2021-10-04
Payer: SELF-PAY

## 2021-10-04 ENCOUNTER — MED ADMIN CHARGE (OUTPATIENT)
Age: 51
End: 2021-10-04

## 2021-10-04 ENCOUNTER — APPOINTMENT (OUTPATIENT)
Dept: FAMILY MEDICINE | Facility: HOSPITAL | Age: 51
End: 2021-10-04

## 2021-10-04 VITALS
OXYGEN SATURATION: 100 % | BODY MASS INDEX: 25.45 KG/M2 | RESPIRATION RATE: 16 BRPM | TEMPERATURE: 97.4 F | DIASTOLIC BLOOD PRESSURE: 87 MMHG | WEIGHT: 126 LBS | SYSTOLIC BLOOD PRESSURE: 129 MMHG | HEART RATE: 84 BPM

## 2021-10-04 DIAGNOSIS — Z00.00 ENCOUNTER FOR GENERAL ADULT MEDICAL EXAMINATION WITHOUT ABNORMAL FINDINGS: ICD-10-CM

## 2021-10-04 DIAGNOSIS — Z20.822 CONTACT WITH AND (SUSPECTED) EXPOSURE TO COVID-19: ICD-10-CM

## 2021-10-04 DIAGNOSIS — Z12.11 ENCOUNTER FOR SCREENING FOR MALIGNANT NEOPLASM OF COLON: ICD-10-CM

## 2021-10-04 DIAGNOSIS — Z87.898 PERSONAL HISTORY OF OTHER SPECIFIED CONDITIONS: ICD-10-CM

## 2021-10-04 DIAGNOSIS — J30.9 ALLERGIC RHINITIS, UNSPECIFIED: ICD-10-CM

## 2021-10-04 DIAGNOSIS — R05 COUGH: ICD-10-CM

## 2021-10-04 DIAGNOSIS — Z92.89 PERSONAL HISTORY OF OTHER MEDICAL TREATMENT: ICD-10-CM

## 2021-10-04 PROCEDURE — G0463: CPT

## 2021-10-06 DIAGNOSIS — R05.9 COUGH, UNSPECIFIED: ICD-10-CM

## 2021-10-06 DIAGNOSIS — J30.9 ALLERGIC RHINITIS, UNSPECIFIED: ICD-10-CM

## 2021-10-06 DIAGNOSIS — Z12.39 ENCOUNTER FOR OTHER SCREENING FOR MALIGNANT NEOPLASM OF BREAST: ICD-10-CM

## 2021-10-07 NOTE — HISTORY OF PRESENT ILLNESS
[FreeTextEntry8] : 51F with PMH of preDM presents acutely for cough. Pt was tested last week for COVID, resulted negative. Pt endorses HA located to the sinuses, rhinorrhea, cough, occasional sore throat and pressure behind eyes. Pt endorses sx onset since receiving COVID vaccine in August, these sx have persisted. To note, pt has not received 2nd dose of COVID vaccine yet. Denies fevers, chills, sick contacts or recent travel.

## 2021-10-07 NOTE — REVIEW OF SYSTEMS
[Nasal Discharge] : nasal discharge [Sore Throat] : sore throat [Postnasal Drip] : postnasal drip [Cough] : cough [Negative] : Musculoskeletal [Earache] : no earache [Hearing Loss] : no hearing loss [Nosebleed] : no nosebleeds [Hoarseness] : no hoarseness [Shortness Of Breath] : no shortness of breath [Wheezing] : no wheezing

## 2021-10-20 ENCOUNTER — RX RENEWAL (OUTPATIENT)
Age: 51
End: 2021-10-20

## 2021-11-08 ENCOUNTER — APPOINTMENT (OUTPATIENT)
Dept: FAMILY MEDICINE | Facility: HOSPITAL | Age: 51
End: 2021-11-08

## 2022-04-02 ENCOUNTER — OUTPATIENT (OUTPATIENT)
Dept: OUTPATIENT SERVICES | Facility: HOSPITAL | Age: 52
LOS: 1 days | End: 2022-04-02
Payer: SELF-PAY

## 2022-04-02 ENCOUNTER — APPOINTMENT (OUTPATIENT)
Dept: FAMILY MEDICINE | Facility: HOSPITAL | Age: 52
End: 2022-04-02

## 2022-04-02 ENCOUNTER — OUTPATIENT (OUTPATIENT)
Dept: OUTPATIENT SERVICES | Facility: HOSPITAL | Age: 52
LOS: 1 days | End: 2022-04-02
Payer: COMMERCIAL

## 2022-04-02 VITALS
RESPIRATION RATE: 16 BRPM | WEIGHT: 131 LBS | DIASTOLIC BLOOD PRESSURE: 78 MMHG | BODY MASS INDEX: 26.41 KG/M2 | OXYGEN SATURATION: 98 % | HEART RATE: 76 BPM | TEMPERATURE: 97.1 F | SYSTOLIC BLOOD PRESSURE: 123 MMHG | HEIGHT: 59 IN

## 2022-04-02 DIAGNOSIS — F32.A DEPRESSION, UNSPECIFIED: ICD-10-CM

## 2022-04-02 DIAGNOSIS — Z00.00 ENCOUNTER FOR GENERAL ADULT MEDICAL EXAMINATION WITHOUT ABNORMAL FINDINGS: ICD-10-CM

## 2022-04-02 DIAGNOSIS — R73.03 PREDIABETES.: ICD-10-CM

## 2022-04-02 PROCEDURE — G0463: CPT

## 2022-04-02 PROCEDURE — 83036 HEMOGLOBIN GLYCOSYLATED A1C: CPT

## 2022-04-02 PROCEDURE — 82274 ASSAY TEST FOR BLOOD FECAL: CPT

## 2022-04-02 PROCEDURE — 80061 LIPID PANEL: CPT

## 2022-04-02 PROCEDURE — 36415 COLL VENOUS BLD VENIPUNCTURE: CPT

## 2022-04-02 PROCEDURE — 84443 ASSAY THYROID STIM HORMONE: CPT

## 2022-04-02 PROCEDURE — 86803 HEPATITIS C AB TEST: CPT

## 2022-04-02 PROCEDURE — 85025 COMPLETE CBC W/AUTO DIFF WBC: CPT

## 2022-04-02 PROCEDURE — 87389 HIV-1 AG W/HIV-1&-2 AB AG IA: CPT

## 2022-04-02 PROCEDURE — 80053 COMPREHEN METABOLIC PANEL: CPT

## 2022-04-06 DIAGNOSIS — F32.A DEPRESSION, UNSPECIFIED: ICD-10-CM

## 2022-04-06 DIAGNOSIS — Z12.11 ENCOUNTER FOR SCREENING FOR MALIGNANT NEOPLASM OF COLON: ICD-10-CM

## 2022-04-07 RX ORDER — FLUTICASONE PROPIONATE 50 UG/1
50 SPRAY, METERED NASAL TWICE DAILY
Qty: 16 | Refills: 0 | Status: COMPLETED | COMMUNITY
Start: 2021-10-04 | End: 2022-04-07

## 2022-04-07 RX ORDER — FLUOXETINE HYDROCHLORIDE 20 MG/1
20 TABLET ORAL DAILY
Qty: 30 | Refills: 2 | Status: COMPLETED | COMMUNITY
Start: 2021-01-09 | End: 2022-04-07

## 2022-04-07 NOTE — PLAN
[FreeTextEntry1] : \par #CSP\par -FIT kit given to patient today\par -Patient refused do pap or breast cancer screening today; will reschedule for later date\par \par \par

## 2022-04-07 NOTE — HISTORY OF PRESENT ILLNESS
[FreeTextEntry1] : CSP [de-identified] : 52 y/o F presenting for CSP. Due for colon cancer screening. Denies personal or family h/o colon cancer. Denies abd pain, n/v/d, melena, constipation, unexplained weight loss.

## 2022-05-03 ENCOUNTER — APPOINTMENT (OUTPATIENT)
Dept: FAMILY MEDICINE | Facility: HOSPITAL | Age: 52
End: 2022-05-03

## 2022-05-04 LAB
ALBUMIN SERPL ELPH-MCNC: 4.9 G/DL
ALP BLD-CCNC: 99 U/L
ALT SERPL-CCNC: 11 U/L
ANION GAP SERPL CALC-SCNC: 13 MMOL/L
AST SERPL-CCNC: 17 U/L
BASOPHILS # BLD AUTO: 0.05 K/UL
BASOPHILS NFR BLD AUTO: 0.9 %
BILIRUB SERPL-MCNC: 0.3 MG/DL
BUN SERPL-MCNC: 16 MG/DL
CALCIUM SERPL-MCNC: 9.6 MG/DL
CHLORIDE SERPL-SCNC: 105 MMOL/L
CHOLEST SERPL-MCNC: 223 MG/DL
CO2 SERPL-SCNC: 23 MMOL/L
CREAT SERPL-MCNC: 0.65 MG/DL
EGFR: 107 ML/MIN/1.73M2
EOSINOPHIL # BLD AUTO: 0.12 K/UL
EOSINOPHIL NFR BLD AUTO: 2.3 %
ESTIMATED AVERAGE GLUCOSE: 126 MG/DL
GLUCOSE SERPL-MCNC: 103 MG/DL
HBA1C MFR BLD HPLC: 6 %
HCT VFR BLD CALC: 40.5 %
HCV AB SER QL: NONREACTIVE
HCV S/CO RATIO: 0.14 S/CO
HDLC SERPL-MCNC: 71 MG/DL
HEMOCCULT STL QL IA: NEGATIVE
HGB BLD-MCNC: 13.1 G/DL
HIV1+2 AB SPEC QL IA.RAPID: NONREACTIVE
IMM GRANULOCYTES NFR BLD AUTO: 0.2 %
LDLC SERPL CALC-MCNC: 125 MG/DL
LYMPHOCYTES # BLD AUTO: 2.08 K/UL
LYMPHOCYTES NFR BLD AUTO: 39.4 %
MAN DIFF?: NORMAL
MCHC RBC-ENTMCNC: 29.2 PG
MCHC RBC-ENTMCNC: 32.3 GM/DL
MCV RBC AUTO: 90.2 FL
MONOCYTES # BLD AUTO: 0.37 K/UL
MONOCYTES NFR BLD AUTO: 7 %
NEUTROPHILS # BLD AUTO: 2.65 K/UL
NEUTROPHILS NFR BLD AUTO: 50.2 %
NONHDLC SERPL-MCNC: 152 MG/DL
PLATELET # BLD AUTO: 269 K/UL
POTASSIUM SERPL-SCNC: 4.6 MMOL/L
PROT SERPL-MCNC: 7.4 G/DL
RBC # BLD: 4.49 M/UL
RBC # FLD: 13.5 %
SODIUM SERPL-SCNC: 140 MMOL/L
TRIGL SERPL-MCNC: 134 MG/DL
TSH SERPL-ACNC: 3.8 UIU/ML
WBC # FLD AUTO: 5.28 K/UL

## 2022-09-17 ENCOUNTER — OUTPATIENT (OUTPATIENT)
Dept: OUTPATIENT SERVICES | Facility: HOSPITAL | Age: 52
LOS: 1 days | End: 2022-09-17
Payer: SELF-PAY

## 2022-09-17 ENCOUNTER — APPOINTMENT (OUTPATIENT)
Dept: FAMILY MEDICINE | Facility: HOSPITAL | Age: 52
End: 2022-09-17

## 2022-09-17 ENCOUNTER — OUTPATIENT (OUTPATIENT)
Dept: OUTPATIENT SERVICES | Facility: HOSPITAL | Age: 52
LOS: 1 days | End: 2022-09-17
Payer: COMMERCIAL

## 2022-09-17 VITALS
HEART RATE: 85 BPM | HEIGHT: 59 IN | OXYGEN SATURATION: 98 % | BODY MASS INDEX: 26.81 KG/M2 | SYSTOLIC BLOOD PRESSURE: 124 MMHG | RESPIRATION RATE: 16 BRPM | WEIGHT: 133 LBS | TEMPERATURE: 98.6 F | DIASTOLIC BLOOD PRESSURE: 71 MMHG

## 2022-09-17 DIAGNOSIS — Z12.4 ENCOUNTER FOR SCREENING FOR MALIGNANT NEOPLASM OF CERVIX: ICD-10-CM

## 2022-09-17 DIAGNOSIS — Z00.00 ENCOUNTER FOR GENERAL ADULT MEDICAL EXAMINATION WITHOUT ABNORMAL FINDINGS: ICD-10-CM

## 2022-09-17 DIAGNOSIS — Z23 ENCOUNTER FOR IMMUNIZATION: ICD-10-CM

## 2022-09-17 DIAGNOSIS — M72.2 PLANTAR FASCIAL FIBROMATOSIS: ICD-10-CM

## 2022-09-17 PROCEDURE — G0463: CPT

## 2022-09-17 NOTE — PHYSICAL EXAM
[No Acute Distress] : no acute distress [Well Nourished] : well nourished [Well Developed] : well developed [Normal Sclera/Conjunctiva] : normal sclera/conjunctiva [Examination Of The Breasts] : a normal appearance [Normal] : normal [No Masses] : no breast masses were palpable [de-identified] : Chaperone Bessie Newby  [de-identified] : L plantar tenderness. No deformity or skin rash. Dorsalis pedis and posterior tibial pulses present.

## 2022-09-17 NOTE — HISTORY OF PRESENT ILLNESS
[FreeTextEntry1] : CSP FIT NOTE  [de-identified] : CSP FIT NOTE  SEE PRIOR NOTE  [FreeTextEntry8] : 41F h/o preDM (A1c 6.0), HLD, glaucoma, GERD, MDD here for L foot pain. She reports 5/10 sharp L heel pain x 6 months, improved with movement, worsened with inactivity. No trauma. Patient sometimes has taken 1 tylenol every few days with no relief, otherwise not taking anything. She is continuing to ambulate regularly but still has pain when she sits down or at night. She denies prior similar pains. She is also CSP for Pap and Mammo, but is refusing pap smear today due to her concerns over her foot pain. She has no other complaints today.

## 2022-09-17 NOTE — REVIEW OF SYSTEMS
[Negative] : Integumentary [Joint Stiffness] : no joint stiffness [Joint Swelling] : no joint swelling [Muscle Weakness] : no muscle weakness [FreeTextEntry9] : L heel pain

## 2022-09-17 NOTE — INTERPRETER SERVICES
[Patient Declined  Services] : - None: Patient declined  services [FreeTextEntry3] : shared language  [TWNoteComboBox1] : [place]

## 2022-09-17 NOTE — HISTORY OF PRESENT ILLNESS
[FreeTextEntry1] : CSP FIT NOTE  [de-identified] : CSP FIT NOTE  SEE PRIOR NOTE  [FreeTextEntry8] : 41F h/o preDM (A1c 6.0), HLD, glaucoma, GERD, MDD here for L foot pain. She reports 5/10 sharp L heel pain x 6 months, improved with movement, worsened with inactivity. No trauma. Patient sometimes has taken 1 tylenol every few days with no relief, otherwise not taking anything. She is continuing to ambulate regularly but still has pain when she sits down or at night. She denies prior similar pains. She is also CSP for Pap and Mammo, but is refusing pap smear today due to her concerns over her foot pain. She has no other complaints today.

## 2022-09-17 NOTE — PHYSICAL EXAM
[No Acute Distress] : no acute distress [Well Nourished] : well nourished [Well Developed] : well developed [Normal Sclera/Conjunctiva] : normal sclera/conjunctiva [Examination Of The Breasts] : a normal appearance [Normal] : normal [No Masses] : no breast masses were palpable [de-identified] : Chaperone Bessie Newby  [de-identified] : L plantar tenderness. No deformity or skin rash. Dorsalis pedis and posterior tibial pulses present.

## 2022-09-17 NOTE — INTERPRETER SERVICES
[Patient Declined  Services] : - None: Patient declined  services [FreeTextEntry3] : shared language  [TWNoteComboBox1] : [place]alese

## 2022-09-19 ENCOUNTER — MED ADMIN CHARGE (OUTPATIENT)
Age: 52
End: 2022-09-19

## 2022-10-05 ENCOUNTER — OUTPATIENT (OUTPATIENT)
Dept: OUTPATIENT SERVICES | Facility: HOSPITAL | Age: 52
LOS: 1 days | End: 2022-10-05
Payer: SELF-PAY

## 2022-10-05 ENCOUNTER — APPOINTMENT (OUTPATIENT)
Dept: FAMILY MEDICINE | Facility: HOSPITAL | Age: 52
End: 2022-10-05

## 2022-10-05 VITALS
DIASTOLIC BLOOD PRESSURE: 84 MMHG | HEART RATE: 80 BPM | WEIGHT: 133 LBS | HEIGHT: 59 IN | TEMPERATURE: 98.2 F | SYSTOLIC BLOOD PRESSURE: 136 MMHG | BODY MASS INDEX: 26.81 KG/M2 | OXYGEN SATURATION: 100 % | RESPIRATION RATE: 16 BRPM

## 2022-10-05 DIAGNOSIS — Z00.00 ENCOUNTER FOR GENERAL ADULT MEDICAL EXAMINATION WITHOUT ABNORMAL FINDINGS: ICD-10-CM

## 2022-10-05 DIAGNOSIS — M72.2 PLANTAR FASCIAL FIBROMATOSIS: ICD-10-CM

## 2022-10-05 PROCEDURE — G0463: CPT

## 2022-10-05 NOTE — REVIEW OF SYSTEMS
[Joint Stiffness] : no joint stiffness [Joint Swelling] : no joint swelling [Muscle Weakness] : no muscle weakness [Negative] : Integumentary [FreeTextEntry9] : L heel pain

## 2022-10-05 NOTE — INTERPRETER SERVICES
[Patient Declined  Services] : - None: Patient declined  services [FreeTextEntry3] : shared language  [TWNoteComboBox1] : Turkish

## 2022-10-05 NOTE — HISTORY OF PRESENT ILLNESS
[FreeTextEntry1] : f/u plantar fasciitis and halitosis  [de-identified] : 41F h/o preDM (A1c 6.0), HLD, glaucoma, GERD, MDD here for f/u on plantar fasciitis which was diagnosed about 3 weeks ago, given trial of OTC ibuprofen and tylenol, given stretching exercise recommendations. Patient bought some insoles for her shoes which made her pain worse, so she stopped using. She has not tried the exercises yet, but has been taking ibuprofen as prescribed with some relief. She also has concern for chronic halitosis since she was a young adult. Her classmates would often tease her for her bad breath, and her family now is also aware of her bad breath. She has frequent dentist visits, and states her dentist could not find a cause for her bad breath. She brushes her teeth two times a day and uses mouth wash, but shortly after her mouth care routine she starts to smell the bad breath again. She also reports occasional sensation of food getting stuck in her throat. She denies runny nose, chronic cough, abdominal pain after eating, gastric reflux symptoms, pain when swallowing, nausea/vomiting, tooth/jaw pain.

## 2022-10-05 NOTE — PHYSICAL EXAM
[No Acute Distress] : no acute distress [Well Nourished] : well nourished [Well Developed] : well developed [Normal Sclera/Conjunctiva] : normal sclera/conjunctiva [Examination Of The Breasts] : a normal appearance [No Masses] : no breast masses were palpable [Normal] : no rash [de-identified] : Chaperone Bessie Newby  [de-identified] : L plantar tenderness. No deformity or skin rash. Dorsalis pedis and posterior tibial pulses present.

## 2022-10-06 DIAGNOSIS — M72.2 PLANTAR FASCIAL FIBROMATOSIS: ICD-10-CM

## 2022-10-06 DIAGNOSIS — R73.03 PREDIABETES: ICD-10-CM

## 2022-10-29 ENCOUNTER — APPOINTMENT (OUTPATIENT)
Dept: FAMILY MEDICINE | Facility: HOSPITAL | Age: 52
End: 2022-10-29

## 2023-01-12 ENCOUNTER — OUTPATIENT (OUTPATIENT)
Dept: OUTPATIENT SERVICES | Facility: HOSPITAL | Age: 53
LOS: 1 days | End: 2023-01-12

## 2023-01-12 ENCOUNTER — OUTPATIENT (OUTPATIENT)
Dept: OUTPATIENT SERVICES | Facility: HOSPITAL | Age: 53
LOS: 1 days | Discharge: HOME | End: 2023-01-12

## 2023-01-12 ENCOUNTER — APPOINTMENT (OUTPATIENT)
Dept: GASTROENTEROLOGY | Facility: CLINIC | Age: 53
End: 2023-01-12
Payer: COMMERCIAL

## 2023-01-12 VITALS
OXYGEN SATURATION: 98 % | HEART RATE: 77 BPM | WEIGHT: 131 LBS | DIASTOLIC BLOOD PRESSURE: 90 MMHG | BODY MASS INDEX: 26.41 KG/M2 | HEIGHT: 59 IN | SYSTOLIC BLOOD PRESSURE: 110 MMHG

## 2023-01-12 DIAGNOSIS — R19.6 HALITOSIS: ICD-10-CM

## 2023-01-12 DIAGNOSIS — R13.10 DYSPHAGIA, UNSPECIFIED: ICD-10-CM

## 2023-01-12 PROCEDURE — ZZZZZ: CPT | Mod: GC

## 2023-01-12 NOTE — PHYSICAL EXAM
[Alert] : alert [Normal Voice/Communication] : normal voice/communication [Sclera] : the sclera and conjunctiva were normal [Normal Lips/Gums] : the lips and gums were normal [Normal Appearance] : the appearance of the neck was normal [No Neck Mass] : no neck mass was observed [No Respiratory Distress] : no respiratory distress [No Acc Muscle Use] : no accessory muscle use [Abdomen Tenderness] : non-tender [No Masses] : no abdominal mass palpated [Abdomen Soft] : soft [] : no hepatosplenomegaly [No Hernia] : no hernia [Involuntary Movements] : no involuntary movements were seen [Normal Color / Pigmentation] : normal skin color and pigmentation [Oriented To Time, Place, And Person] : oriented to person, place, and time [Normal Affect] : the affect was normal [de-identified] : Appropriate dentition, no visible lesions  [de-identified] : Regular rate

## 2023-01-12 NOTE — END OF VISIT
[] : Fellow [FreeTextEntry3] : #Possibly dysphagia, chronic halitosis: May be due to atypical GERD, but proximal esophageal diverticulum on differential as well and warrants evaluation. Non-GI etiologies also on differential as well (poor dentition, tonsillar disease, etc). Remote history of normal EGD. \par #CRC screening: Reports normal colonoscopy in past and recent negative FIT testing\par \par --Prior to repeating EGD, would benefit from barium evaluation with MBS + timed esophagram with barium tablet\par --If no structural etiology or abnormality noted on barium studies, may consider repeat EGD and possible objective acid testing

## 2023-01-12 NOTE — PHYSICAL EXAM
[Alert] : alert [Normal Voice/Communication] : normal voice/communication [Sclera] : the sclera and conjunctiva were normal [Normal Lips/Gums] : the lips and gums were normal [Normal Appearance] : the appearance of the neck was normal [No Neck Mass] : no neck mass was observed [No Respiratory Distress] : no respiratory distress [No Acc Muscle Use] : no accessory muscle use [Abdomen Tenderness] : non-tender [No Masses] : no abdominal mass palpated [Abdomen Soft] : soft [] : no hepatosplenomegaly [No Hernia] : no hernia [Involuntary Movements] : no involuntary movements were seen [Normal Color / Pigmentation] : normal skin color and pigmentation [Oriented To Time, Place, And Person] : oriented to person, place, and time [Normal Affect] : the affect was normal [de-identified] : Appropriate dentition, no visible lesions  [de-identified] : Regular rate

## 2023-01-12 NOTE — HISTORY OF PRESENT ILLNESS
[FreeTextEntry1] : 51 yo F with GERD, chronic halitosis, preDM, glaucoma, MDD, plantar fasciitis who presents for food getting stuck in her throat.\par \par Patient states that she has a sensation of a lot of mucous pulling at the back of her throat, but denies solid or liquids getting stuck at any point with intake and feels like she has two balls sitting at the side of her upper throat. Also reporting halitosis. She has been experiencing all of these sx since she was a child. Sometimes coughs up tiny, rock like substance when she wakes up. No n/v/d, constipation, bloody red/black stool, no painful or difficulty swallowing/initially swallowing and no choking/cough with po intake. She denies heartburn, chest pain, or abdominal pain. Denies trying anything for it. \par \par Note she had an EGD  in 2012 for dyspepsia that was unremarkable.\par Patient reportedly had a colonoscopy at that time for abdominal pain that was also normal around that time.\par \par 4/2022 Hgb and liver chemistries wnl

## 2023-01-12 NOTE — PHYSICAL EXAM
[Alert] : alert [Normal Voice/Communication] : normal voice/communication [Sclera] : the sclera and conjunctiva were normal [Normal Lips/Gums] : the lips and gums were normal [Normal Appearance] : the appearance of the neck was normal [No Neck Mass] : no neck mass was observed [No Respiratory Distress] : no respiratory distress [No Acc Muscle Use] : no accessory muscle use [Abdomen Tenderness] : non-tender [No Masses] : no abdominal mass palpated [Abdomen Soft] : soft [] : no hepatosplenomegaly [No Hernia] : no hernia [Involuntary Movements] : no involuntary movements were seen [Normal Color / Pigmentation] : normal skin color and pigmentation [Oriented To Time, Place, And Person] : oriented to person, place, and time [Normal Affect] : the affect was normal [de-identified] : Appropriate dentition, no visible lesions  [de-identified] : Regular rate

## 2023-01-12 NOTE — REVIEW OF SYSTEMS
[As Noted in HPI] : as noted in HPI [Fever] : no fever [Chills] : no chills [Sore Throat] : no sore throat [Hoarseness] : no hoarseness [Chest Pain] : no chest pain [Shortness Of Breath] : no shortness of breath [Cough] : no cough

## 2023-02-28 ENCOUNTER — OUTPATIENT (OUTPATIENT)
Dept: OUTPATIENT SERVICES | Facility: HOSPITAL | Age: 53
LOS: 1 days | Discharge: HOME | End: 2023-02-28

## 2023-02-28 ENCOUNTER — OUTPATIENT (OUTPATIENT)
Dept: OUTPATIENT SERVICES | Facility: HOSPITAL | Age: 53
LOS: 1 days | End: 2023-02-28

## 2023-02-28 ENCOUNTER — APPOINTMENT (OUTPATIENT)
Dept: SPEECH THERAPY | Facility: HOSPITAL | Age: 53
End: 2023-02-28

## 2023-02-28 ENCOUNTER — APPOINTMENT (OUTPATIENT)
Dept: RADIOLOGY | Facility: HOSPITAL | Age: 53
End: 2023-02-28
Payer: COMMERCIAL

## 2023-02-28 ENCOUNTER — NON-APPOINTMENT (OUTPATIENT)
Age: 53
End: 2023-02-28

## 2023-02-28 DIAGNOSIS — R13.10 DYSPHAGIA, UNSPECIFIED: ICD-10-CM

## 2023-02-28 PROCEDURE — 74230 X-RAY XM SWLNG FUNCJ C+: CPT | Mod: 26

## 2023-03-01 DIAGNOSIS — R13.10 DYSPHAGIA, UNSPECIFIED: ICD-10-CM

## 2023-03-02 ENCOUNTER — NON-APPOINTMENT (OUTPATIENT)
Age: 53
End: 2023-03-02

## 2023-03-06 NOTE — ASSESSMENT
[FreeTextEntry1] : MODIFIED BARIUM SWALLOW STUDY \par \par Date of Report: 23 \par Date of Evaluation: 23 \par Patient Name: Mylene Louis  \par YOB: 1970  \par Primary Diagnosis: OroPharyngeal Dysphagia \par Treatment Diagnosis:  OroPharyngeal Dysphagia \par Referring Physician: Dr. Jona Esparza (GI)  \par \par IMPRESSIONS/RESULTS:  \par 1.There was No Aspiration before, during or after the swallow for all PO trials.  \par \par Reason For Referral: This 52 year old female was seen for a Modified Barium Swallow to determine patient's ability to safely tolerate an oral diet.Language Line  utilized throughout the evaluation, ID # [id number]. Patient reports that food gets stuck at the throat.  \par \par Current Nutritional Intake: Regular Solids with Thin Liquids per patient report  \par \par Medical History: As per [employer]scripts, patient presents with the following Past Medical History:  \par History of  (637.90) (O03.9) \par History of Acute pharyngitis, unspecified etiology (462) (J02.9) \par History of Asymptomatic varicose veins of both lower extremities (454.9) (I83.93) \par History of Back pain, acute (724.5) (M54.9) \par History of Close exposure to COVID-19 virus (V01.79) (Z20.822) \par History of Colon cancer screening (V76.51) (Z12.11) \par History of Encounter for routine gynecological examination (V72.31) (Z01.419) \par History of Female genital lesion (629.89) (N94.9) \par History of Gastroesophageal reflux disease, esophagitis presence not specified\par (530.81) (K21.9) \par History of Herpes simplex type 1 antibody positive (054.9) (B00.9) \par History of abdominal pain (V13.89) (Z87.898) \par History of acne (V13.3) (Z87.2) \par History of acne (V13.3) (Z87.2) \par History of acute pharyngitis (V12.69) (Z87.09) \par History of amenorrhea (V13.29) (Z87.42) \par History of dizziness (V13.89) (Z87.898) \par History of esophageal reflux (V12.79) (Z87.19) \par History of fatigue (V13.89) (Z87.898) \par History of folliculitis (V13.3) (Z87.2) \par History of gastroesophageal reflux (GERD) (V12.79) (Z87.19) \par History of influenza vaccination (V49.89) (Z92.29) \par History of left flank pain (V13.89) (Z87.898) \par History of screening mammography (V15.89) (Z92.89) \par History of Need for vaccination (V05.9) (Z23) \par History of Overweight (BMI 25.0-29.9) (278.02) (E66.3) \par History of Paresthesia (782.0) (R20.2) \par History of Poor sleep pattern (780.59) (G47.8) \par History of Refused influenza vaccine (V64.06) (Z28.21) \par History of Sensation of fullness in right ear (388.8) (H93.8X1) \par History of URI, acute (465.9) (J06.9) \par History of Urticaria, acute (708.8) (L50.8) \par History of Vaginal burning (625.8) (N94.9) \par History of Vesicular lesion (709.8) (R23.8) \par History of Vision changes (368.9) (H53.9) \par History of Vitamin D deficiency (268.9) (E55.9) \par \par  \par ASSESSMENT \par \par The patient was assessed standing in the lateral plane in the [facility] Radiology Suite, with Radiologist present.  The patient was alert, cooperative.  Secretion management was adequate.  Throat Clearing noted prior to test administration.  \par \par \par Of Note: Barium Esophagram completed after the Modified Barium Swallow. See Radiologist report for details.  \par \par Consistencies Administered:   \par Solids:  Puree, Regular Solid \par Liquids:  Thin Liquids  \par \par SUMMARY & IMPRESSION \par The patient demonstrated the following: \par \par Patient presents with functional oral and pharyngeal stage of swallow given puree, regular solids and thin liquids. Oral stage marked by adequate bolus containment, timely mastication with adequate ability to break down solids, adequate bolus manipulation, piecemeal transfer (~3 swallows per bolus) with adequate oral clearance. Pharyngeal stage marked by adequate initiation of the pharyngeal swallow trigger, adequate laryngeal elevation with adequate laryngeal vestibule closure, adequate tongue base retraction and adequate pharyngeal constriction. There  was adequate pharyngeal clearance across PO trials. There was No Aspiration before, during or after the swallow for thin liquids, puree and regular solids.  \par \par Aspiration - Penetration Scale:  \par PUREE:1 \par SOLIDS:1 \par THIN LIQUIDS: 1 \par \par Aspiration - Penetration Scale    (Stanislaw et al Dysphagia 11:93-98 (1996), Aspiration-Penetration Scale) \par 1.    Material does not enter the airway \par 2.    Material enters the airway, remains above the vocal folds, and is ejected from the airway \par 3.    Material enters the airway, remains above the vocal folds, and is not ejected \par 4.    Material enters the airway, contacts the vocal folds, and is ejected from the airway \par 5.    Material enters the airway, contacts the vocal folds, and is not ejected from the airway \par 6.    Material enters the airway, passes below the vocal folds and is ejected into the larynx or out of the airway \par 7.    Material enters the airway, passes below the vocal folds, and is not ejected from the trachea despite effort \par 8.    Material enters the airway, passes below the vocal folds, and no effort is made to eject\par \par \par \par RECOMMENDATIONS: \par 1.Continue with current diet of Regular Solids with Thin Liquids  \par 2. Aspiration & Reflux Precautions  \par 3. Daily Oral Hygiene  \par 4. Consider ENT Consult at MD’s discretion given reported stuck sensation at the throat.  \par 4. Follow up with referring GI Physician.  \par \par All questions were answered with patient demonstrating good understanding. \par \par \par Should you have any additional concerns, please contact the Center at [phone].\par  \par \par Micheline Amaya M.S. CCC-SLP  \par Speech-Language Pathologist  \par [facility] \par \par

## 2023-03-06 NOTE — ASSESSMENT
[FreeTextEntry1] : MODIFIED BARIUM SWALLOW STUDY \par \par Date of Report: 23 \par Date of Evaluation: 23 \par Patient Name: Mylene Louis  \par YOB: 1970  \par Primary Diagnosis: OroPharyngeal Dysphagia \par Treatment Diagnosis:  OroPharyngeal Dysphagia \par Referring Physician: Dr. Jona Esparza (GI)  \par \par IMPRESSIONS/RESULTS:  \par 1.There was No Aspiration before, during or after the swallow for all PO trials.  \par \par Reason For Referral: This 52 year old female was seen for a Modified Barium Swallow to determine patient's ability to safely tolerate an oral diet.Language Line  utilized throughout the evaluation, ID # [id number]. Patient reports that food gets stuck at the throat.  \par \par Current Nutritional Intake: Regular Solids with Thin Liquids per patient report  \par \par Medical History: As per Allscripts, patient presents with the following Past Medical History:  \par History of  (637.90) (O03.9) \par History of Acute pharyngitis, unspecified etiology (462) (J02.9) \par History of Asymptomatic varicose veins of both lower extremities (454.9) (I83.93) \par History of Back pain, acute (724.5) (M54.9) \par History of Close exposure to COVID-19 virus (V01.79) (Z20.822) \par History of Colon cancer screening (V76.51) (Z12.11) \par History of Encounter for routine gynecological examination (V72.31) (Z01.419) \par History of Female genital lesion (629.89) (N94.9) \par History of Gastroesophageal reflux disease, esophagitis presence not specified\par (530.81) (K21.9) \par History of Herpes simplex type 1 antibody positive (054.9) (B00.9) \par History of abdominal pain (V13.89) (Z87.898) \par History of acne (V13.3) (Z87.2) \par History of acne (V13.3) (Z87.2) \par History of acute pharyngitis (V12.69) (Z87.09) \par History of amenorrhea (V13.29) (Z87.42) \par History of dizziness (V13.89) (Z87.898) \par History of esophageal reflux (V12.79) (Z87.19) \par History of fatigue (V13.89) (Z87.898) \par History of folliculitis (V13.3) (Z87.2) \par History of gastroesophageal reflux (GERD) (V12.79) (Z87.19) \par History of influenza vaccination (V49.89) (Z92.29) \par History of left flank pain (V13.89) (Z87.898) \par History of screening mammography (V15.89) (Z92.89) \par History of Need for vaccination (V05.9) (Z23) \par History of Overweight (BMI 25.0-29.9) (278.02) (E66.3) \par History of Paresthesia (782.0) (R20.2) \par History of Poor sleep pattern (780.59) (G47.8) \par History of Refused influenza vaccine (V64.06) (Z28.21) \par History of Sensation of fullness in right ear (388.8) (H93.8X1) \par History of URI, acute (465.9) (J06.9) \par History of Urticaria, acute (708.8) (L50.8) \par History of Vaginal burning (625.8) (N94.9) \par History of Vesicular lesion (709.8) (R23.8) \par History of Vision changes (368.9) (H53.9) \par History of Vitamin D deficiency (268.9) (E55.9) \par \par  \par ASSESSMENT \par \par The patient was assessed standing in the lateral plane in the [facility] Radiology Suite, with Radiologist present.  The patient was alert, cooperative.  Secretion management was adequate.  Throat Clearing noted prior to test administration.  \par \par \par Of Note: Barium Esophagram completed after the Modified Barium Swallow. See Radiologist report for details.  \par \par Consistencies Administered:   \par Solids:  Puree, Regular Solid \par Liquids:  Thin Liquids  \par \par SUMMARY & IMPRESSION \par The patient demonstrated the following: \par \par Patient presents with functional oral and pharyngeal stage of swallow given puree, regular solids and thin liquids. Oral stage marked by adequate bolus containment, timely mastication with adequate ability to break down solids, adequate bolus manipulation, piecemeal transfer (~3 swallows per bolus) with adequate oral clearance. Pharyngeal stage marked by adequate initiation of the pharyngeal swallow trigger, adequate laryngeal elevation with adequate laryngeal vestibule closure, adequate tongue base retraction and adequate pharyngeal constriction. There  was adequate pharyngeal clearance across PO trials. There was No Aspiration before, during or after the swallow for thin liquids, puree and regular solids.  \par \par Aspiration - Penetration Scale:  \par PUREE:1 \par SOLIDS:1 \par THIN LIQUIDS: 1 \par \par Aspiration - Penetration Scale    (Stanislaw et al Dysphagia 11:93-98 (1996), Aspiration-Penetration Scale) \par 1.    Material does not enter the airway \par 2.    Material enters the airway, remains above the vocal folds, and is ejected from the airway \par 3.    Material enters the airway, remains above the vocal folds, and is not ejected \par 4.    Material enters the airway, contacts the vocal folds, and is ejected from the airway \par 5.    Material enters the airway, contacts the vocal folds, and is not ejected from the airway \par 6.    Material enters the airway, passes below the vocal folds and is ejected into the larynx or out of the airway \par 7.    Material enters the airway, passes below the vocal folds, and is not ejected from the trachea despite effort \par 8.    Material enters the airway, passes below the vocal folds, and no effort is made to eject\par \par \par \par RECOMMENDATIONS: \par 1.Continue with current diet of Regular Solids with Thin Liquids  \par 2. Aspiration & Reflux Precautions  \par 3. Daily Oral Hygiene  \par 4. Consider ENT Consult at MD’s discretion given reported stuck sensation at the throat.  \par 4. Follow up with referring GI Physician.  \par \par All questions were answered with patient demonstrating good understanding. \par \par \par Should you have any additional concerns, please contact the Center at [phone].\par  \par \par Micheline Amaya M.S. CCC-SLP  \par Speech-Language Pathologist  \par [facility] \par \par

## 2023-03-06 NOTE — ASSESSMENT
[FreeTextEntry1] : MODIFIED BARIUM SWALLOW STUDY \par \par Date of Report: 23 \par Date of Evaluation: 23 \par Patient Name: Mylene Louis  \par YOB: 1970  \par Primary Diagnosis: OroPharyngeal Dysphagia \par Treatment Diagnosis:  OroPharyngeal Dysphagia \par Referring Physician: Dr. Jona Esparza (GI)  \par \par IMPRESSIONS/RESULTS:  \par 1.There was No Aspiration before, during or after the swallow for all PO trials.  \par \par Reason For Referral: This 52 year old female was seen for a Modified Barium Swallow to determine patient's ability to safely tolerate an oral diet.Language Line  utilized throughout the evaluation, ID # [id number]. Patient reports that food gets stuck at the throat.  \par \par Current Nutritional Intake: Regular Solids with Thin Liquids per patient report  \par \par Medical History: As per Allscripts, patient presents with the following Past Medical History:  \par History of  (637.90) (O03.9) \par History of Acute pharyngitis, unspecified etiology (462) (J02.9) \par History of Asymptomatic varicose veins of both lower extremities (454.9) (I83.93) \par History of Back pain, acute (724.5) (M54.9) \par History of Close exposure to COVID-19 virus (V01.79) (Z20.822) \par History of Colon cancer screening (V76.51) (Z12.11) \par History of Encounter for routine gynecological examination (V72.31) (Z01.419) \par History of Female genital lesion (629.89) (N94.9) \par History of Gastroesophageal reflux disease, esophagitis presence not specified\par (530.81) (K21.9) \par History of Herpes simplex type 1 antibody positive (054.9) (B00.9) \par History of abdominal pain (V13.89) (Z87.898) \par History of acne (V13.3) (Z87.2) \par History of acne (V13.3) (Z87.2) \par History of acute pharyngitis (V12.69) (Z87.09) \par History of amenorrhea (V13.29) (Z87.42) \par History of dizziness (V13.89) (Z87.898) \par History of esophageal reflux (V12.79) (Z87.19) \par History of fatigue (V13.89) (Z87.898) \par History of folliculitis (V13.3) (Z87.2) \par History of gastroesophageal reflux (GERD) (V12.79) (Z87.19) \par History of influenza vaccination (V49.89) (Z92.29) \par History of left flank pain (V13.89) (Z87.898) \par History of screening mammography (V15.89) (Z92.89) \par History of Need for vaccination (V05.9) (Z23) \par History of Overweight (BMI 25.0-29.9) (278.02) (E66.3) \par History of Paresthesia (782.0) (R20.2) \par History of Poor sleep pattern (780.59) (G47.8) \par History of Refused influenza vaccine (V64.06) (Z28.21) \par History of Sensation of fullness in right ear (388.8) (H93.8X1) \par History of URI, acute (465.9) (J06.9) \par History of Urticaria, acute (708.8) (L50.8) \par History of Vaginal burning (625.8) (N94.9) \par History of Vesicular lesion (709.8) (R23.8) \par History of Vision changes (368.9) (H53.9) \par History of Vitamin D deficiency (268.9) (E55.9) \par \par  \par ASSESSMENT \par \par The patient was assessed standing in the lateral plane in the [facility] Radiology Suite, with Radiologist present.  The patient was alert, cooperative.  Secretion management was adequate.  Throat Clearing noted prior to test administration.  \par \par \par Of Note: Barium Esophagram completed after the Modified Barium Swallow. See Radiologist report for details.  \par \par Consistencies Administered:   \par Solids:  Puree, Regular Solid \par Liquids:  Thin Liquids  \par \par SUMMARY & IMPRESSION \par The patient demonstrated the following: \par \par Patient presents with functional oral and pharyngeal stage of swallow given puree, regular solids and thin liquids. Oral stage marked by adequate bolus containment, timely mastication with adequate ability to break down solids, adequate bolus manipulation, piecemeal transfer (~3 swallows per bolus) with adequate oral clearance. Pharyngeal stage marked by adequate initiation of the pharyngeal swallow trigger, adequate laryngeal elevation with adequate laryngeal vestibule closure, adequate tongue base retraction and adequate pharyngeal constriction. There  was adequate pharyngeal clearance across PO trials. There was No Aspiration before, during or after the swallow for thin liquids, puree and regular solids.  \par \par Aspiration - Penetration Scale:  \par PUREE:1 \par SOLIDS:1 \par THIN LIQUIDS: 1 \par \par Aspiration - Penetration Scale    (Stanislaw et al Dysphagia 11:93-98 (1996), Aspiration-Penetration Scale) \par 1.    Material does not enter the airway \par 2.    Material enters the airway, remains above the vocal folds, and is ejected from the airway \par 3.    Material enters the airway, remains above the vocal folds, and is not ejected \par 4.    Material enters the airway, contacts the vocal folds, and is ejected from the airway \par 5.    Material enters the airway, contacts the vocal folds, and is not ejected from the airway \par 6.    Material enters the airway, passes below the vocal folds and is ejected into the larynx or out of the airway \par 7.    Material enters the airway, passes below the vocal folds, and is not ejected from the trachea despite effort \par 8.    Material enters the airway, passes below the vocal folds, and no effort is made to eject\par \par \par \par RECOMMENDATIONS: \par 1.Continue with current diet of Regular Solids with Thin Liquids  \par 2. Aspiration & Reflux Precautions  \par 3. Daily Oral Hygiene  \par 4. Consider ENT Consult at MD’s discretion given reported stuck sensation at the throat.  \par 4. Follow up with referring GI Physician.  \par \par All questions were answered with patient demonstrating good understanding. \par \par \par Should you have any additional concerns, please contact the Center at [phone].\par  \par \par Micheline Amaya M.S. CCC-SLP  \par Speech-Language Pathologist  \par [facility] \par \par

## 2023-03-23 DIAGNOSIS — R13.12 DYSPHAGIA, OROPHARYNGEAL PHASE: ICD-10-CM

## 2023-03-27 NOTE — ASSESSMENT
[FreeTextEntry1] :                                                                      MODIFIED BARIUM SWALLOW STUDY \par \par Date of Report: 23 \par Date of Evaluation: 23 \par Patient Name: Mylene Louis  \par YOB: 1970  \par Primary Diagnosis: OroPharyngeal Dysphagia \par Treatment Diagnosis:  OroPharyngeal Dysphagia \par Referring Physician: Dr. Jona Esparza (GI)  \par \par IMPRESSIONS/RESULTS:  \par 1.There was No Aspiration before, during or after the swallow for all PO trials.  \par \par Reason For Referral: This 52 year old female was seen for a Modified Barium Swallow to determine patient's ability to safely tolerate an oral diet. Language Line  utilized throughout the evaluation, ID # [id number]. Patient reports that food occasionally gets stuck at the throat.  \par \par Current Nutritional Intake: Regular Solids with Thin Liquids per patient report  \par \par Medical History: As per [employer], patient presents with the following Past Medical History:  \par History of  (637.90) (O03.9) \par History of Acute pharyngitis, unspecified etiology (462) (J02.9) \par History of Asymptomatic varicose veins of both lower extremities (454.9) (I83.93) \par History of Back pain, acute (724.5) (M54.9) \par History of Close exposure to COVID-19 virus (V01.79) (Z20.822) \par History of Colon cancer screening (V76.51) (Z12.11) \par History of Encounter for routine gynecological examination (V72.31) (Z01.419) \par History of Female genital lesion (629.89) (N94.9) \par History of Gastroesophageal reflux disease, esophagitis presence not specified (530.81) (K21.9) \par History of Herpes simplex type 1 antibody positive (054.9) (B00.9) \par History of abdominal pain (V13.89) (Z87.898) \par History of acne (V13.3) (Z87.2) \par History of acne (V13.3) (Z87.2) \par History of acute pharyngitis (V12.69) (Z87.09) \par History of amenorrhea (V13.29) (Z87.42) \par History of dizziness (V13.89) (Z87.898) \par History of esophageal reflux (V12.79) (Z87.19) \par History of fatigue (V13.89) (Z87.898) \par History of folliculitis (V13.3) (Z87.2) \par History of gastroesophageal reflux (GERD) (V12.79) (Z87.19) \par History of influenza vaccination (V49.89) (Z92.29) \par History of left flank pain (V13.89) (Z87.898) \par History of screening mammography (V15.89) (Z92.89) \par History of Need for vaccination (V05.9) (Z23) \par History of Overweight (BMI 25.0-29.9) (278.02) (E66.3) \par History of Paresthesia (782.0) (R20.2) \par History of Poor sleep pattern (780.59) (G47.8) \par History of Refused influenza vaccine (V64.06) (Z28.21) \par History of Sensation of fullness in right ear (388.8) (H93.8X1) \par History of URI, acute (465.9) (J06.9) \par History of Urticaria, acute (708.8) (L50.8) \par History of Vaginal burning (625.8) (N94.9) \par History of Vesicular lesion (709.8) (R23.8) \par History of Vision changes (368.9) (H53.9) \par History of Vitamin D deficiency (268.9) (E55.9) \par \par ASSESSMENT \par The patient was assessed standing in the lateral plane in the [facility], with Radiologist present.  The patient was alert, cooperative.  Secretion management was adequate.  Throat Clearing noted prior to test administration.  \par \par Of Note: Barium Esophagram completed after the Modified Barium Swallow. See Radiologist report for details.  \par \par Consistencies Administered:   \par Solids:  Puree, Regular Solid \par Liquids:  Thin Liquids  \par \par SUMMARY & IMPRESSION \par The patient demonstrated the following: \par \par Patient presents with functional oral and pharyngeal stage of swallow given puree, regular solids and thin liquids. Oral stage marked by adequate bolus containment, timely mastication with adequate ability to break down solids, adequate bolus manipulation, piecemeal transfer (~3 swallows per bolus) with adequate oral clearance. Pharyngeal stage marked by adequate initiation of the pharyngeal swallow trigger, adequate laryngeal elevation with adequate laryngeal vestibule closure, adequate tongue base retraction and adequate pharyngeal constriction. There  was adequate pharyngeal clearance across PO trials. There was No Aspiration before, during or after the swallow for thin liquids, puree and regular solids.  \par \par \par Aspiration - Penetration Scale:  \par PUREE:1 \par SOLIDS:1 \par THIN LIQUIDS: 1 \par \par Aspiration - Penetration Scale    (Marialuisak et al Dysphagia 11:93-98 (1996), Aspiration-Penetration Scale) \par 1.    Material does not enter the airway \par \par RECOMMENDATIONS: \par 1.Continue with current diet of Regular Solids with Thin Liquids  \par 2. Aspiration & Reflux Precautions  \par 3. Daily Oral Hygiene  \par 4. Consider ENT Consult at MD’s discretion given reported stuck sensation at the throat.  \par 5. Follow up with referring GI Physician.  \par \par All questions were answered with patient demonstrating good understanding. \par \par Should you have any additional concerns, please contact the Center at [phone]. \par  \par Micheline Amaya M.S. CCC-SLP  \par Speech-Language Pathologist  \par [facility] \par

## 2023-04-04 DIAGNOSIS — R13.12 DYSPHAGIA, OROPHARYNGEAL PHASE: ICD-10-CM

## 2023-04-14 ENCOUNTER — NON-APPOINTMENT (OUTPATIENT)
Age: 53
End: 2023-04-14

## 2023-05-09 NOTE — REVIEW OF SYSTEMS
Biopsy Method: double edge Personna blade [Suicidal] : not suicidal [Insomnia] : insomnia [Depression] : depression [Anxiety] : no anxiety [Negative] : Heme/Lymph [FreeTextEntry9] : left flank pain

## 2023-05-26 ENCOUNTER — APPOINTMENT (OUTPATIENT)
Dept: FAMILY MEDICINE | Facility: HOSPITAL | Age: 53
End: 2023-05-26

## 2023-05-26 ENCOUNTER — OUTPATIENT (OUTPATIENT)
Dept: OUTPATIENT SERVICES | Facility: HOSPITAL | Age: 53
LOS: 1 days | End: 2023-05-26
Payer: SELF-PAY

## 2023-05-26 VITALS
OXYGEN SATURATION: 99 % | WEIGHT: 131 LBS | HEART RATE: 71 BPM | DIASTOLIC BLOOD PRESSURE: 66 MMHG | SYSTOLIC BLOOD PRESSURE: 119 MMHG | RESPIRATION RATE: 14 BRPM | BODY MASS INDEX: 26.46 KG/M2 | TEMPERATURE: 98.2 F

## 2023-05-26 DIAGNOSIS — R82.90 UNSPECIFIED ABNORMAL FINDINGS IN URINE: ICD-10-CM

## 2023-05-26 DIAGNOSIS — Z00.00 ENCOUNTER FOR GENERAL ADULT MEDICAL EXAMINATION WITHOUT ABNORMAL FINDINGS: ICD-10-CM

## 2023-05-26 PROCEDURE — 85027 COMPLETE CBC AUTOMATED: CPT

## 2023-05-26 PROCEDURE — 83036 HEMOGLOBIN GLYCOSYLATED A1C: CPT

## 2023-05-26 PROCEDURE — G0463: CPT

## 2023-05-26 PROCEDURE — 80061 LIPID PANEL: CPT

## 2023-05-26 PROCEDURE — 80053 COMPREHEN METABOLIC PANEL: CPT

## 2023-05-26 PROCEDURE — 81002 URINALYSIS NONAUTO W/O SCOPE: CPT

## 2023-05-28 PROBLEM — R82.90 MALODOROUS URINE: Status: ACTIVE | Noted: 2023-05-28

## 2023-05-28 NOTE — REVIEW OF SYSTEMS
[Negative] : Psychiatric [Dysuria] : no dysuria [Incontinence] : no incontinence [Nocturia] : no nocturia [Poor Libido] : libido not poor [Hematuria] : no hematuria [Frequency] : no frequency [Vaginal Discharge] : no vaginal discharge [Dysmenorrhea] : no dysmenorrhea

## 2023-05-28 NOTE — HEALTH RISK ASSESSMENT
[No] : No [Never (0 pts)] : Never (0 points) [No falls in past year] : Patient reported no falls in the past year [0] : 2) Feeling down, depressed, or hopeless: Not at all (0) [Never] : Never

## 2023-05-28 NOTE — PHYSICAL EXAM
[Normal] : normal rate, regular rhythm, normal S1 and S2 and no murmur heard [No Abdominal Bruit] : a ~M bruit was not heard ~T in the abdomen [No Varicosities] : no varicosities [Pedal Pulses Present] : the pedal pulses are present [No Edema] : there was no peripheral edema [Soft] : abdomen soft [Non Tender] : non-tender [Non-distended] : non-distended [No CVA Tenderness] : no CVA  tenderness [No Spinal Tenderness] : no spinal tenderness [No Joint Swelling] : no joint swelling [No Rash] : no rash

## 2023-05-28 NOTE — HISTORY OF PRESENT ILLNESS
[FreeTextEntry8] : 41F h/o preDM (A1c 6.0), HLD, glaucoma, GERD, MDD presnts for perstent smelly urine for about 3 weeks. Pt states she is not on any medications at this time. Pt denies any recent strong in her diet. Pt also denies any vaginal symptoms ie: vaginal discharge or vaginal bleeding. Pt states she is not experiencing urinary frequency, urgency or dysuria. Pt states urine is yellow and not dark.

## 2023-05-31 DIAGNOSIS — R82.90 UNSPECIFIED ABNORMAL FINDINGS IN URINE: ICD-10-CM

## 2023-05-31 LAB
ALBUMIN SERPL ELPH-MCNC: 4.9 G/DL
ALP BLD-CCNC: 115 U/L
ALT SERPL-CCNC: 22 U/L
ANION GAP SERPL CALC-SCNC: 13 MMOL/L
AST SERPL-CCNC: 22 U/L
BILIRUB SERPL-MCNC: 0.4 MG/DL
BUN SERPL-MCNC: 12 MG/DL
CALCIUM SERPL-MCNC: 10 MG/DL
CHLORIDE SERPL-SCNC: 102 MMOL/L
CHOLEST SERPL-MCNC: 249 MG/DL
CO2 SERPL-SCNC: 26 MMOL/L
CREAT SERPL-MCNC: 0.61 MG/DL
EGFR: 108 ML/MIN/1.73M2
ESTIMATED AVERAGE GLUCOSE: 131 MG/DL
GLUCOSE SERPL-MCNC: 99 MG/DL
HBA1C MFR BLD HPLC: 6.2 %
HCT VFR BLD CALC: 40.9 %
HDLC SERPL-MCNC: 71 MG/DL
HGB BLD-MCNC: 13 G/DL
LDLC SERPL CALC-MCNC: 151 MG/DL
MCHC RBC-ENTMCNC: 30 PG
MCHC RBC-ENTMCNC: 31.8 GM/DL
MCV RBC AUTO: 94.2 FL
NONHDLC SERPL-MCNC: 178 MG/DL
PLATELET # BLD AUTO: 276 K/UL
POTASSIUM SERPL-SCNC: 5.2 MMOL/L
PROT SERPL-MCNC: 7.9 G/DL
RBC # BLD: 4.34 M/UL
RBC # FLD: 14.6 %
SODIUM SERPL-SCNC: 142 MMOL/L
TRIGL SERPL-MCNC: 134 MG/DL
WBC # FLD AUTO: 5.85 K/UL

## 2023-09-13 ENCOUNTER — APPOINTMENT (OUTPATIENT)
Dept: FAMILY MEDICINE | Facility: HOSPITAL | Age: 53
End: 2023-09-13

## 2023-09-13 ENCOUNTER — OUTPATIENT (OUTPATIENT)
Dept: OUTPATIENT SERVICES | Facility: HOSPITAL | Age: 53
LOS: 1 days | End: 2023-09-13
Payer: SELF-PAY

## 2023-09-13 DIAGNOSIS — Z12.11 ENCOUNTER FOR SCREENING FOR MALIGNANT NEOPLASM OF COLON: ICD-10-CM

## 2023-09-13 DIAGNOSIS — H92.02 OTALGIA, LEFT EAR: ICD-10-CM

## 2023-09-13 DIAGNOSIS — Z00.00 ENCOUNTER FOR GENERAL ADULT MEDICAL EXAMINATION WITHOUT ABNORMAL FINDINGS: ICD-10-CM

## 2023-09-13 DIAGNOSIS — Z12.39 ENCOUNTER FOR OTHER SCREENING FOR MALIGNANT NEOPLASM OF BREAST: ICD-10-CM

## 2023-09-13 DIAGNOSIS — Z13.31 ENCOUNTER FOR SCREENING FOR DEPRESSION: ICD-10-CM

## 2023-09-13 PROCEDURE — G0463: CPT

## 2023-09-15 PROBLEM — Z12.39 BREAST CANCER SCREENING: Status: ACTIVE | Noted: 2022-09-17

## 2023-09-15 PROBLEM — Z12.11 COLON CANCER SCREENING: Status: ACTIVE | Noted: 2022-04-02

## 2023-09-15 PROBLEM — Z13.31 POSITIVE SCREENING FOR DEPRESSION ON 9-ITEM PATIENT HEALTH QUESTIONNAIRE (PHQ-9): Status: ACTIVE | Noted: 2023-09-15

## 2023-09-15 PROBLEM — H92.02 DISCOMFORT OF LEFT EAR: Status: ACTIVE | Noted: 2023-09-13

## 2023-09-15 RX ORDER — FLUTICASONE FUROATE 27.5 UG/1
27.5 SPRAY, METERED NASAL DAILY
Qty: 1 | Refills: 0 | Status: ACTIVE | COMMUNITY
Start: 2023-09-15 | End: 1900-01-01

## 2023-09-19 ENCOUNTER — OUTPATIENT (OUTPATIENT)
Dept: OUTPATIENT SERVICES | Facility: HOSPITAL | Age: 53
LOS: 1 days | End: 2023-09-19

## 2023-09-19 DIAGNOSIS — R73.03 PREDIABETES: ICD-10-CM

## 2023-09-19 DIAGNOSIS — Z00.00 ENCOUNTER FOR GENERAL ADULT MEDICAL EXAMINATION WITHOUT ABNORMAL FINDINGS: ICD-10-CM

## 2023-09-19 PROCEDURE — 83036 HEMOGLOBIN GLYCOSYLATED A1C: CPT

## 2023-09-19 PROCEDURE — 82274 ASSAY TEST FOR BLOOD FECAL: CPT

## 2023-09-19 PROCEDURE — 36415 COLL VENOUS BLD VENIPUNCTURE: CPT

## 2023-09-19 PROCEDURE — G0463: CPT

## 2023-09-19 PROCEDURE — 80061 LIPID PANEL: CPT

## 2023-09-21 LAB
CHOLEST SERPL-MCNC: 211 MG/DL
ESTIMATED AVERAGE GLUCOSE: 131 MG/DL
HBA1C MFR BLD HPLC: 6.2 %
HDLC SERPL-MCNC: 64 MG/DL
LDLC SERPL CALC-MCNC: 115 MG/DL
NONHDLC SERPL-MCNC: 146 MG/DL
TRIGL SERPL-MCNC: 183 MG/DL

## 2023-09-25 ENCOUNTER — NON-APPOINTMENT (OUTPATIENT)
Age: 53
End: 2023-09-25

## 2023-10-09 ENCOUNTER — NON-APPOINTMENT (OUTPATIENT)
Age: 53
End: 2023-10-09

## 2023-11-02 ENCOUNTER — NON-APPOINTMENT (OUTPATIENT)
Age: 53
End: 2023-11-02

## 2023-11-08 ENCOUNTER — APPOINTMENT (OUTPATIENT)
Dept: MAMMOGRAPHY | Facility: HOSPITAL | Age: 53
End: 2023-11-08

## 2023-11-10 LAB — HEMOCCULT STL QL IA: NEGATIVE

## 2023-11-14 ENCOUNTER — NON-APPOINTMENT (OUTPATIENT)
Age: 53
End: 2023-11-14

## 2023-11-28 ENCOUNTER — APPOINTMENT (OUTPATIENT)
Dept: MAMMOGRAPHY | Facility: HOSPITAL | Age: 53
End: 2023-11-28

## 2023-12-09 ENCOUNTER — NON-APPOINTMENT (OUTPATIENT)
Age: 53
End: 2023-12-09

## 2023-12-16 ENCOUNTER — NON-APPOINTMENT (OUTPATIENT)
Age: 53
End: 2023-12-16

## 2024-03-21 ENCOUNTER — NON-APPOINTMENT (OUTPATIENT)
Age: 54
End: 2024-03-21

## 2024-03-21 ENCOUNTER — APPOINTMENT (OUTPATIENT)
Dept: OPHTHALMOLOGY | Facility: CLINIC | Age: 54
End: 2024-03-21
Payer: COMMERCIAL

## 2024-03-21 PROCEDURE — 76514 ECHO EXAM OF EYE THICKNESS: CPT

## 2024-03-21 PROCEDURE — 92133 CPTRZD OPH DX IMG PST SGM ON: CPT

## 2024-03-21 PROCEDURE — 92014 COMPRE OPH EXAM EST PT 1/>: CPT

## 2024-07-17 ENCOUNTER — APPOINTMENT (OUTPATIENT)
Dept: FAMILY MEDICINE | Facility: HOSPITAL | Age: 54
End: 2024-07-17

## 2024-07-17 ENCOUNTER — OUTPATIENT (OUTPATIENT)
Dept: OUTPATIENT SERVICES | Facility: HOSPITAL | Age: 54
LOS: 1 days | End: 2024-07-17
Payer: SELF-PAY

## 2024-07-17 VITALS
OXYGEN SATURATION: 98 % | WEIGHT: 128 LBS | SYSTOLIC BLOOD PRESSURE: 123 MMHG | TEMPERATURE: 100.5 F | HEART RATE: 119 BPM | DIASTOLIC BLOOD PRESSURE: 65 MMHG | BODY MASS INDEX: 25.85 KG/M2 | RESPIRATION RATE: 15 BRPM

## 2024-07-17 VITALS — DIASTOLIC BLOOD PRESSURE: 77 MMHG | HEART RATE: 111 BPM | OXYGEN SATURATION: 98 % | SYSTOLIC BLOOD PRESSURE: 118 MMHG

## 2024-07-17 DIAGNOSIS — J02.9 ACUTE PHARYNGITIS, UNSPECIFIED: ICD-10-CM

## 2024-07-17 DIAGNOSIS — Z00.00 ENCOUNTER FOR GENERAL ADULT MEDICAL EXAMINATION WITHOUT ABNORMAL FINDINGS: ICD-10-CM

## 2024-07-17 PROCEDURE — G0463: CPT

## 2024-09-19 ENCOUNTER — NON-APPOINTMENT (OUTPATIENT)
Age: 54
End: 2024-09-19

## 2024-09-19 ENCOUNTER — APPOINTMENT (OUTPATIENT)
Dept: OPHTHALMOLOGY | Facility: CLINIC | Age: 54
End: 2024-09-19
Payer: COMMERCIAL

## 2024-09-19 PROCEDURE — 92012 INTRM OPH EXAM EST PATIENT: CPT

## 2024-09-19 PROCEDURE — 92083 EXTENDED VISUAL FIELD XM: CPT

## 2024-10-24 ENCOUNTER — MED ADMIN CHARGE (OUTPATIENT)
Age: 54
End: 2024-10-24

## 2024-10-24 ENCOUNTER — APPOINTMENT (OUTPATIENT)
Dept: FAMILY MEDICINE | Facility: HOSPITAL | Age: 54
End: 2024-10-24

## 2024-10-24 ENCOUNTER — OUTPATIENT (OUTPATIENT)
Dept: OUTPATIENT SERVICES | Facility: HOSPITAL | Age: 54
LOS: 1 days | End: 2024-10-24
Payer: SELF-PAY

## 2024-10-24 VITALS
SYSTOLIC BLOOD PRESSURE: 124 MMHG | OXYGEN SATURATION: 100 % | BODY MASS INDEX: 27.06 KG/M2 | WEIGHT: 134 LBS | TEMPERATURE: 98.3 F | HEART RATE: 77 BPM | DIASTOLIC BLOOD PRESSURE: 80 MMHG | RESPIRATION RATE: 16 BRPM

## 2024-10-24 DIAGNOSIS — Z00.00 ENCOUNTER FOR GENERAL ADULT MEDICAL EXAMINATION WITHOUT ABNORMAL FINDINGS: ICD-10-CM

## 2024-10-24 DIAGNOSIS — B35.1 TINEA UNGUIUM: ICD-10-CM

## 2024-10-24 DIAGNOSIS — R45.89 OTHER SYMPTOMS AND SIGNS INVOLVING EMOTIONAL STATE: ICD-10-CM

## 2024-10-24 PROCEDURE — G0463: CPT

## 2024-10-25 PROBLEM — B35.1 ONYCHOMYCOSIS: Status: ACTIVE | Noted: 2024-10-24

## 2024-10-25 PROBLEM — R45.89 DEPRESSED MOOD: Status: ACTIVE | Noted: 2024-10-24

## 2024-10-28 LAB
ALBUMIN SERPL ELPH-MCNC: 4.7 G/DL
ALP BLD-CCNC: 114 U/L
ALT SERPL-CCNC: 14 U/L
ANION GAP SERPL CALC-SCNC: 17 MMOL/L
AST SERPL-CCNC: 19 U/L
BILIRUB SERPL-MCNC: 0.2 MG/DL
BUN SERPL-MCNC: 11 MG/DL
CALCIUM SERPL-MCNC: 9.7 MG/DL
CHLORIDE SERPL-SCNC: 101 MMOL/L
CO2 SERPL-SCNC: 21 MMOL/L
CREAT SERPL-MCNC: 0.49 MG/DL
EGFR: 112 ML/MIN/1.73M2
GLUCOSE SERPL-MCNC: 106 MG/DL
POTASSIUM SERPL-SCNC: 4 MMOL/L
PROT SERPL-MCNC: 7.7 G/DL
SODIUM SERPL-SCNC: 139 MMOL/L

## 2024-10-30 RX ORDER — TERBINAFINE HYDROCHLORIDE 250 MG/1
250 TABLET ORAL DAILY
Qty: 30 | Refills: 2 | Status: ACTIVE | COMMUNITY
Start: 2024-10-30 | End: 1900-01-01

## 2024-12-09 ENCOUNTER — NON-APPOINTMENT (OUTPATIENT)
Age: 54
End: 2024-12-09

## 2024-12-19 PROBLEM — Z23 ENCOUNTER FOR IMMUNIZATION: Status: RESOLVED | Noted: 2021-10-04 | Resolved: 2024-12-19

## 2024-12-19 PROBLEM — Z12.4 CERVICAL CANCER SCREENING: Status: RESOLVED | Noted: 2022-09-17 | Resolved: 2024-12-19

## 2024-12-19 PROBLEM — R82.90 MALODOROUS URINE: Status: RESOLVED | Noted: 2023-05-28 | Resolved: 2024-12-19

## 2024-12-19 PROBLEM — Z87.09 HISTORY OF ALLERGIC RHINITIS: Status: RESOLVED | Noted: 2021-10-04 | Resolved: 2024-12-19

## 2024-12-19 PROBLEM — Z13.31 POSITIVE SCREENING FOR DEPRESSION ON 9-ITEM PATIENT HEALTH QUESTIONNAIRE (PHQ-9): Status: RESOLVED | Noted: 2023-09-15 | Resolved: 2024-12-19

## 2024-12-19 PROBLEM — Z78.9 NO FAMILY HISTORY OF COLORECTAL CANCER: Status: ACTIVE | Noted: 2024-12-19

## 2024-12-19 PROBLEM — M72.2 PLANTAR FASCIITIS: Status: RESOLVED | Noted: 2022-09-17 | Resolved: 2024-12-19
